# Patient Record
Sex: FEMALE | Race: WHITE | NOT HISPANIC OR LATINO | Employment: UNEMPLOYED | URBAN - NONMETROPOLITAN AREA
[De-identification: names, ages, dates, MRNs, and addresses within clinical notes are randomized per-mention and may not be internally consistent; named-entity substitution may affect disease eponyms.]

---

## 2017-03-24 ENCOUNTER — OFFICE VISIT (OUTPATIENT)
Dept: OBSTETRICS AND GYNECOLOGY | Facility: CLINIC | Age: 67
End: 2017-03-24

## 2017-03-24 VITALS
WEIGHT: 158 LBS | SYSTOLIC BLOOD PRESSURE: 120 MMHG | DIASTOLIC BLOOD PRESSURE: 70 MMHG | BODY MASS INDEX: 28 KG/M2 | HEIGHT: 63 IN

## 2017-03-24 DIAGNOSIS — R68.82 DECREASED LIBIDO: ICD-10-CM

## 2017-03-24 DIAGNOSIS — Z01.411 ENCOUNTER FOR GYNECOLOGICAL EXAMINATION WITH ABNORMAL FINDING: ICD-10-CM

## 2017-03-24 DIAGNOSIS — R35.0 URINARY FREQUENCY: Primary | ICD-10-CM

## 2017-03-24 DIAGNOSIS — N94.10 DYSPAREUNIA IN FEMALE: ICD-10-CM

## 2017-03-24 DIAGNOSIS — N95.2 POSTMENOPAUSAL ATROPHIC VAGINITIS: ICD-10-CM

## 2017-03-24 LAB
COLOR UR: YELLOW
GLUCOSE UR STRIP-MCNC: NEGATIVE MG/DL
LEUKOCYTE EST, POC: NEGATIVE
NITRITE UR-MCNC: NEGATIVE MG/ML
PROT UR STRIP-MCNC: NEGATIVE MG/DL
RBC # UR STRIP: NEGATIVE /UL

## 2017-03-24 PROCEDURE — G0101 CA SCREEN;PELVIC/BREAST EXAM: HCPCS | Performed by: OBSTETRICS & GYNECOLOGY

## 2017-03-24 PROCEDURE — 99202 OFFICE O/P NEW SF 15 MIN: CPT | Performed by: OBSTETRICS & GYNECOLOGY

## 2017-03-24 PROCEDURE — 81002 URINALYSIS NONAUTO W/O SCOPE: CPT | Performed by: OBSTETRICS & GYNECOLOGY

## 2017-03-24 RX ORDER — ESTRADIOL 1 MG/1
1 TABLET ORAL DAILY
Qty: 90 TABLET | Refills: 4 | Status: SHIPPED | OUTPATIENT
Start: 2017-03-24 | End: 2022-01-25

## 2017-03-24 RX ORDER — CETIRIZINE HYDROCHLORIDE 10 MG/1
10 TABLET ORAL DAILY
COMMUNITY

## 2017-03-24 RX ORDER — ESTRADIOL 0.1 MG/G
CREAM VAGINAL
Qty: 30 G | Refills: 12 | Status: SHIPPED | OUTPATIENT
Start: 2017-03-24 | End: 2020-01-27

## 2017-03-24 RX ORDER — ESTRADIOL 1 MG/1
TABLET ORAL
COMMUNITY
Start: 2017-03-06 | End: 2017-03-24

## 2017-03-24 RX ORDER — CITALOPRAM 20 MG/1
TABLET ORAL
COMMUNITY
Start: 2016-12-21 | End: 2020-01-27

## 2017-03-24 NOTE — PROGRESS NOTES
"Subjective  Chief Complaint   Patient presents with   • Gynecologic Exam   • vaginal dryness     Patient is 66 y.o.  here for annual.  Pt had last pap 5-6 years ago.  Pt with history of SHIMON 33 years ago secondary to endometriosis.  Pt still has ovaries.  Pt has been on HRT since age 41.  Pt denies any hot flashes or night sweats.  Pt does have complaints of vaginal dryness and dyspareunia.  Pt does use lubricants.  Pt also with decreased libido secondary to pain.  Pt had MMG in .    History  Past Medical History:   Diagnosis Date   • Arthritis    • Reflux esophagitis      No current outpatient prescriptions on file prior to visit.     No current facility-administered medications on file prior to visit.      Allergies   Allergen Reactions   • Demerol [Meperidine]      Past Surgical History:   Procedure Laterality Date   • CHOLECYSTECTOMY     • HYSTEROSCOPY      SHIMON   • ROTATOR CUFF REPAIR      right     History reviewed. No pertinent family history.  Social History     Social History   • Marital status:      Spouse name: N/A   • Number of children: N/A   • Years of education: N/A     Social History Main Topics   • Smoking status: Never Smoker   • Smokeless tobacco: Never Used   • Alcohol use No   • Drug use: No   • Sexual activity: Yes     Other Topics Concern   • None     Social History Narrative   • None     Review of Systems  All systems were reviewed and negative except for:  Constitution:  positive for trouble sleeping  Eyes:  positive for change of vision  Genitourinary: postivie for  frequency  Musculoskeletal: positive for  joint pain    Objective  Vitals:    17 1323   BP: 120/70   Weight: 158 lb (71.7 kg)   Height: 63\" (160 cm)     Physical Exam:  General Appearance: alert, appears stated age and cooperative  Head: normocephalic, without obvious abnormality and atraumatic  Eyes: lids and lashes normal, conjunctivae and sclerae normal, no icterus, no pallor and corneas clear  Neck: " suppple, trachea midline and no thyromegaly  Lungs: clear to auscultation, respirations regular, respirations even and respirations unlabored  Heart: regular rhythm and normal rate, normal S1, S2, no murmur, gallop, or rubs and no click  Breasts: Examined in supine position  Symmetric without masses or skin dimpling  Nipples normal without inversion, lesions or discharge  There are no palpable axillary nodes  Abdomen: normal bowel sounds, no masses, no hepatomegaly, no splenomegaly, soft non-tender, no guarding and no rebound tenderness  Pelvic: Clinical staff was present for exam  External genitalia:  normal appearance of the external genitalia including Bartholin's and [place]'s glands.  :  urethral meatus normal; urethral hypermobility is absent.  Vaginal:  atrophic mucosal changes are present;  Cervix:  absent.  Uterus:  absent.  Adnexa:  normal bimanual exam of the adnexa.  Extremities: moves extremities well, no edema, no cyanosis and no redness  Skin: no bleeding, bruising or rash and no lesions noted  Psych: normal mood and affect, oriented to person, time and place, thought content organized and appropriate judgment    Lab Review   UA    Imaging   No data reviewed    Assessment/Plan    Problem List Items Addressed This Visit     None      Visit Diagnoses     Urinary frequency    -  Primary    Relevant Orders    POC Urinalysis Dipstick (Completed)    Encounter for gynecological examination with abnormal finding      Pap done; MMG done  Pt with acquired dyspareunia and atrophic changes.  Various options discussed with patient.  Rx compounded estrogen cream given; instructions and precautions given.  Pt to call or f/u if no improvement in 6-8 wks.    Relevant Orders    Pap IG, Rfx HPV ASCU    Postmenopausal atrophic vaginitis        Relevant Medications    estradiol (ESTRACE) 1 MG tablet    estradiol (ESTRACE) 0.1 MG/GM vaginal cream    Dyspareunia in female        Relevant Medications    estradiol (ESTRACE)  0.1 MG/GM vaginal cream    Decreased libido        Relevant Medications    citalopram (CeleXA) 20 MG tablet    estradiol (ESTRACE) 0.1 MG/GM vaginal cream          Follow up prn    This note was electronically signed.  Anastasia Chen M.D.

## 2017-03-31 DIAGNOSIS — Z01.411 ENCOUNTER FOR GYNECOLOGICAL EXAMINATION WITH ABNORMAL FINDING: ICD-10-CM

## 2019-01-21 ENCOUNTER — OFFICE VISIT (OUTPATIENT)
Dept: ORTHOPEDIC SURGERY | Facility: CLINIC | Age: 69
End: 2019-01-21

## 2019-01-21 VITALS — OXYGEN SATURATION: 98 % | BODY MASS INDEX: 25.94 KG/M2 | HEART RATE: 70 BPM | WEIGHT: 146.39 LBS | HEIGHT: 63 IN

## 2019-01-21 DIAGNOSIS — G89.29 CHRONIC RIGHT SHOULDER PAIN: Primary | ICD-10-CM

## 2019-01-21 DIAGNOSIS — M25.511 CHRONIC RIGHT SHOULDER PAIN: Primary | ICD-10-CM

## 2019-01-21 PROCEDURE — 99203 OFFICE O/P NEW LOW 30 MIN: CPT | Performed by: ORTHOPAEDIC SURGERY

## 2019-01-21 RX ORDER — MECLIZINE HCL 12.5 MG/1
12.5 TABLET ORAL 3 TIMES DAILY PRN
COMMUNITY
End: 2020-01-27

## 2019-01-21 RX ORDER — PANTOPRAZOLE SODIUM 40 MG/1
TABLET, DELAYED RELEASE ORAL
COMMUNITY

## 2019-01-21 RX ORDER — IPRATROPIUM BROMIDE 21 UG/1
SPRAY, METERED NASAL
COMMUNITY

## 2019-01-21 NOTE — PROGRESS NOTES
[facility] [place] [facility] Note    Subjective     Chief Complaint   Patient presents with   • Right Shoulder - Pain        HPI    Ernestina Barnhart is a 68 y.o. female.  She reports pain in the right shoulder, which is been present for 15 years, with surgery with a rotator cuff repair in 1997 with Dr. Arroyo.  The pain is 7 out of 10, throbbing, worse with working and when she sleeps on it at night.  2 previous injections in the past, with brief relief.  MRI was performed in 2015.  No long-term improvement with anti-inflammatories.      There is no problem list on file for this patient.    Past Medical History:   Diagnosis Date   • Arthritis    • Reflux esophagitis       Past Surgical History:   Procedure Laterality Date   • CHOLECYSTECTOMY     • HYSTEROSCOPY  1984    SHIMON   • ROTATOR CUFF REPAIR      right      History reviewed. No pertinent family history.  Social History     Socioeconomic History   • Marital status:      Spouse name: Not on file   • Number of children: Not on file   • Years of education: Not on file   • Highest education level: Not on file   Social Needs   • Financial resource strain: Not on file   • Food insecurity - worry: Not on file   • Food insecurity - inability: Not on file   • Transportation needs - medical: Not on file   • Transportation needs - non-medical: Not on file   Occupational History   • Not on file   Tobacco Use   • Smoking status: Never Smoker   • Smokeless tobacco: Never Used   Substance and Sexual Activity   • Alcohol use: No   • Drug use: No   • Sexual activity: Yes   Other Topics Concern   • Not on file   Social History Narrative   • Not on file      Current Outpatient Medications on File Prior to Visit   Medication Sig Dispense Refill   • meclizine (ANTIVERT) 12.5 MG tablet Take 12.5 mg by mouth 3 (Three) Times a Day As Needed for dizziness.     • cetirizine (zyrTEC) 10 MG tablet Take 10 mg by mouth Daily.     • citalopram (CeleXA) 20 MG tablet      • estradiol  (ESTRACE) 0.1 MG/GM vaginal cream 0.5 grams intravaginal 2-3x/wk 30 g 12   • estradiol (ESTRACE) 1 MG tablet Take 1 tablet by mouth Daily. 90 tablet 4   • ipratropium (ATROVENT) 0.03 % nasal spray ipratropium bromide 0.03 % nasal spray   Spray 2 sprays every day by intranasal route.     • pantoprazole (PROTONIX) 40 MG EC tablet pantoprazole 40 mg tablet,delayed release     • [DISCONTINUED] NEXIUM 40 MG capsule        No current facility-administered medications on file prior to visit.       Allergies   Allergen Reactions   • Demerol [Meperidine]         Review of Systems   Constitutional: Negative for activity change, appetite change, chills, diaphoresis, fatigue, fever and unexpected weight change.   HENT: Positive for ear pain, hearing loss, postnasal drip and sinus pressure. Negative for congestion, dental problem, drooling, ear discharge, facial swelling, mouth sores, nosebleeds, rhinorrhea, sneezing, sore throat, tinnitus, trouble swallowing and voice change.    Eyes: Positive for pain and itching. Negative for photophobia, discharge, redness and visual disturbance.   Respiratory: Negative for apnea, cough, choking, chest tightness, shortness of breath, wheezing and stridor.    Cardiovascular: Negative for chest pain, palpitations and leg swelling.   Gastrointestinal: Negative for abdominal distention, abdominal pain, anal bleeding, blood in stool, constipation, diarrhea, nausea, rectal pain and vomiting.   Endocrine: Negative for cold intolerance, heat intolerance, polydipsia, polyphagia and polyuria.   Genitourinary: Positive for frequency, pelvic pain and urgency. Negative for decreased urine volume, difficulty urinating, dysuria, enuresis, flank pain, genital sores and hematuria.   Musculoskeletal: Positive for arthralgias, back pain, joint swelling, neck pain and neck stiffness. Negative for gait problem and myalgias.   Skin: Negative for color change, pallor, rash and wound.   Allergic/Immunologic:  "Negative for environmental allergies, food allergies and immunocompromised state.   Neurological: Positive for dizziness and headaches. Negative for tremors, seizures, syncope, facial asymmetry, speech difficulty, weakness, light-headedness and numbness.   Hematological: Negative for adenopathy. Does not bruise/bleed easily.   Psychiatric/Behavioral: Positive for sleep disturbance. Negative for agitation, behavioral problems, confusion, decreased concentration, dysphoric mood, hallucinations, self-injury and suicidal ideas. The patient is not nervous/anxious and is not hyperactive.         Objective      Physical Exam  Pulse 70   Ht 160 cm (62.99\")   Wt 66.4 kg (146 lb 6.2 oz)   SpO2 98%   BMI 25.94 kg/m²     Body mass index is 25.94 kg/m².    General:   Mental Status:  Alert   Appearance: Cooperative, in no acute distress   Build and Nutrition: Well-nourished and well developed female   Orientation: Alert and oriented to person, place and time   Posture: Normal   Gait: Normal    Integument:   Right shoulder: No skin lesions, no rash, no ecchymosis    Neurologic:   Sensation:    Right hand: Intact to light touch in the digits   Motor:  Right upper extremity: 5/5 deltoid, biceps, triceps, wrist flexors, wrist extensors, and interossei    Vascular:   Right upper extremity: 2+ radial pulse, prompt capillary refill    Upper Extremities:   Right Shoulder:    Tenderness:  None    Swelling:  None    Crepitus: None    Atrophy:  None    Range of motion:  External rotation:  60°       Forward flexion:  170°       Abduction:   160°  Instability:  None  Deformities:  None  Functional testing: Negative drop arm, negative lift-off, positive impingement      Imaging/Studies  Imaging Results (last 24 hours)     Procedure Component Value Units Date/Time    XR Shoulder 2+ View Right [[id number]] Resulted:  01/21/19 1710     Updated:  01/21/19 1711    Narrative:       Right Shoulder Radiographs  Indication: right shoulder " pain  Views: AP, outlet and axillary views of the right shoulder    Comparison: no prior studies available for review    Findings:  Fine calcifications in the region of the rotator cuff, near the insertion   point greater tuberosity, with acromioclavicular joint degeneration, with   no acute bony abnormalities.  Humeral head is not high riding.                Assessment and Plan     Ernestina was seen today for pain.    Diagnoses and all orders for this visit:    Chronic right shoulder pain  -     XR Shoulder 2+ View Right  -     FL Contrast Injection CT / MRI; Future  -     MRI shoulder right arthrogram; Future        I reviewed my findings with patient today.  She has a long-standing history of right shoulder pain, with a previous rotator cuff repair in 1997 with Dr. Arroyo.  At this point, I have recommended a repeat MRI, with and without intra-articular contrast, specifically looking at her rotator cuff.  I will see her back after the MRI has been completed, but sooner for any problems.    Return for After Imaging Study.      Medical Decision Making  Data/Risk: radiology tests and independent visualization of imaging, lab tests, or EMG/NCV      Pito Ordoñez MD  01/21/19  5:28 PM

## 2019-01-29 ENCOUNTER — HOSPITAL ENCOUNTER (OUTPATIENT)
Dept: MRI IMAGING | Facility: HOSPITAL | Age: 69
Discharge: HOME | End: 2019-01-29
Attending: ORTHOPAEDIC SURGERY

## 2019-01-29 ENCOUNTER — HOSPITAL ENCOUNTER (OUTPATIENT)
Dept: GENERAL RADIOLOGY | Facility: HOSPITAL | Age: 69
Discharge: HOME | End: 2019-01-29
Attending: ORTHOPAEDIC SURGERY | Admitting: RADIOLOGY

## 2019-01-29 DIAGNOSIS — G89.29 CHRONIC RIGHT SHOULDER PAIN: ICD-10-CM

## 2019-01-29 DIAGNOSIS — M25.511 CHRONIC RIGHT SHOULDER PAIN: ICD-10-CM

## 2019-01-29 PROCEDURE — 0 GADOBENATE DIMEGLUMINE 529 MG/ML SOLUTION: Performed by: ORTHOPAEDIC SURGERY

## 2019-01-29 PROCEDURE — 73222 MRI JOINT UPR EXTREM W/DYE: CPT

## 2019-01-29 PROCEDURE — 77002 NEEDLE LOCALIZATION BY XRAY: CPT

## 2019-01-29 PROCEDURE — A9577 INJ MULTIHANCE: HCPCS | Performed by: ORTHOPAEDIC SURGERY

## 2019-01-29 PROCEDURE — 25010000002 IOPAMIDOL 61 % SOLUTION: Performed by: ORTHOPAEDIC SURGERY

## 2019-01-29 RX ORDER — LIDOCAINE HYDROCHLORIDE 10 MG/ML
10 INJECTION, SOLUTION INFILTRATION; PERINEURAL ONCE
Status: COMPLETED | OUTPATIENT
Start: 2019-01-29 | End: 2019-01-29

## 2019-01-29 RX ADMIN — LIDOCAINE HYDROCHLORIDE 10 ML: 10 INJECTION, SOLUTION INFILTRATION; PERINEURAL at 14:12

## 2019-01-29 RX ADMIN — GADOBENATE DIMEGLUMINE 0.2 ML: 529 INJECTION, SOLUTION INTRAVENOUS at 14:11

## 2019-01-29 RX ADMIN — IOPAMIDOL 10 ML: 612 INJECTION, SOLUTION INTRAVENOUS at 14:12

## 2019-02-13 ENCOUNTER — OFFICE VISIT (OUTPATIENT)
Dept: ORTHOPEDIC SURGERY | Facility: CLINIC | Age: 69
End: 2019-02-13

## 2019-02-13 VITALS — HEART RATE: 73 BPM | BODY MASS INDEX: 25.59 KG/M2 | OXYGEN SATURATION: 99 % | WEIGHT: 149.91 LBS | HEIGHT: 64 IN

## 2019-02-13 DIAGNOSIS — M75.101 ROTATOR CUFF SYNDROME OF RIGHT SHOULDER: Primary | ICD-10-CM

## 2019-02-13 PROCEDURE — 99213 OFFICE O/P EST LOW 20 MIN: CPT | Performed by: ORTHOPAEDIC SURGERY

## 2019-02-13 PROCEDURE — 20610 DRAIN/INJ JOINT/BURSA W/O US: CPT | Performed by: ORTHOPAEDIC SURGERY

## 2019-02-13 RX ORDER — LIDOCAINE HYDROCHLORIDE 10 MG/ML
4 INJECTION, SOLUTION INFILTRATION; PERINEURAL
Status: COMPLETED | OUTPATIENT
Start: 2019-02-13 | End: 2019-02-13

## 2019-02-13 RX ORDER — TRIAMCINOLONE ACETONIDE 40 MG/ML
40 INJECTION, SUSPENSION INTRA-ARTICULAR; INTRAMUSCULAR
Status: COMPLETED | OUTPATIENT
Start: 2019-02-13 | End: 2019-02-13

## 2019-02-13 RX ADMIN — LIDOCAINE HYDROCHLORIDE 4 ML: 10 INJECTION, SOLUTION INFILTRATION; PERINEURAL at 16:24

## 2019-02-13 RX ADMIN — TRIAMCINOLONE ACETONIDE 40 MG: 40 INJECTION, SUSPENSION INTRA-ARTICULAR; INTRAMUSCULAR at 16:24

## 2019-02-13 NOTE — PROGRESS NOTES
[facility] [place] [facility] [place]    Subjective     Chief Complaint   Patient presents with   • Follow-up     Post MRI right shoulder         HPI    Ernestina Barnhart is a 68 y.o. female.  She follows up today for the MRI results for right shoulder.  Pain is been ongoing for 20 years, with a history of rotator cuff repair by Dr. Arroyo in 1997.  Pain bothers her when she sleeps on it at night, is 7 out of 10, and aching in quality.  No new complaints compared last time that she was here.      There is no problem list on file for this patient.    Past Medical History:   Diagnosis Date   • Arthritis    • Reflux esophagitis       Past Surgical History:   Procedure Laterality Date   • CHOLECYSTECTOMY     • HYSTEROSCOPY  1984    SHIMON   • ROTATOR CUFF REPAIR      right      History reviewed. No pertinent family history.  Social History     Socioeconomic History   • Marital status:      Spouse name: Not on file   • Number of children: Not on file   • Years of education: Not on file   • Highest education level: Not on file   Social Needs   • Financial resource strain: Not on file   • Food insecurity - worry: Not on file   • Food insecurity - inability: Not on file   • Transportation needs - medical: Not on file   • Transportation needs - non-medical: Not on file   Occupational History   • Not on file   Tobacco Use   • Smoking status: Never Smoker   • Smokeless tobacco: Never Used   Substance and Sexual Activity   • Alcohol use: No   • Drug use: No   • Sexual activity: Yes   Other Topics Concern   • Not on file   Social History Narrative   • Not on file      Current Outpatient Medications on File Prior to Visit   Medication Sig Dispense Refill   • cetirizine (zyrTEC) 10 MG tablet Take 10 mg by mouth Daily.     • citalopram (CeleXA) 20 MG tablet      • estradiol (ESTRACE) 0.1 MG/GM vaginal cream 0.5 grams intravaginal 2-3x/wk 30 g 12   • estradiol (ESTRACE) 1 MG tablet Take 1 tablet by mouth Daily. 90 tablet 4   •  ipratropium (ATROVENT) 0.03 % nasal spray ipratropium bromide 0.03 % nasal spray   Spray 2 sprays every day by intranasal route.     • meclizine (ANTIVERT) 12.5 MG tablet Take 12.5 mg by mouth 3 (Three) Times a Day As Needed for dizziness.     • pantoprazole (PROTONIX) 40 MG EC tablet pantoprazole 40 mg tablet,delayed release       No current facility-administered medications on file prior to visit.       Allergies   Allergen Reactions   • Demerol [Meperidine]         Review of Systems   Constitutional: Negative for activity change, appetite change, chills, diaphoresis, fatigue, fever and unexpected weight change.   HENT: Negative for congestion, dental problem, drooling, ear discharge, ear pain, facial swelling, hearing loss, mouth sores, nosebleeds, postnasal drip, rhinorrhea, sinus pressure, sneezing, sore throat, tinnitus, trouble swallowing and voice change.    Eyes: Negative for photophobia, pain, discharge, redness, itching and visual disturbance.   Respiratory: Negative for apnea, cough, choking, chest tightness, shortness of breath, wheezing and stridor.    Cardiovascular: Negative for chest pain, palpitations and leg swelling.   Gastrointestinal: Negative for abdominal distention, abdominal pain, anal bleeding, blood in stool, constipation, diarrhea, nausea, rectal pain and vomiting.   Endocrine: Negative for cold intolerance, heat intolerance, polydipsia, polyphagia and polyuria.   Genitourinary: Negative for decreased urine volume, difficulty urinating, dysuria, enuresis, flank pain, frequency, genital sores, hematuria and urgency.   Musculoskeletal: Positive for joint swelling. Negative for arthralgias, back pain, gait problem, myalgias, neck pain and neck stiffness.   Skin: Negative for color change, pallor, rash and wound.   Allergic/Immunologic: Negative for environmental allergies, food allergies and immunocompromised state.   Neurological: Negative for dizziness, tremors, seizures, syncope, facial  "asymmetry, speech difficulty, weakness, light-headedness, numbness and headaches.   Hematological: Negative for adenopathy. Does not bruise/bleed easily.   Psychiatric/Behavioral: Negative for agitation, behavioral problems, confusion, decreased concentration, dysphoric mood, hallucinations, self-injury, sleep disturbance and suicidal ideas. The patient is not nervous/anxious and is not hyperactive.         Objective      Physical Exam  Pulse 73   Ht 162.6 cm (64.02\")   Wt 68 kg (149 lb 14.6 oz)   SpO2 99%   BMI 25.72 kg/m²     Body mass index is 25.72 kg/m².    General:   Mental Status:  Alert   Appearance: Cooperative, in no acute distress   Build and Nutrition: Well-nourished and well developed female   Orientation: Alert and oriented to person, place and time   Posture: Normal   Gait: Normal    Upper Extremities:   Right Shoulder:    Tenderness:  None    Swelling:  None    Range of motion:  External rotation:  60°       Forward flexion:  160°       Abduction:   150°  Deformities:  None  Functional testing: Negative drop arm, negative lift-off, positive impingement          Imaging/Studies   EXAMINATION: MRI SHOULDER RIGHT ARTHROGRAM- 01/29/2019     INDICATION: Shoulder pain, rotator cuff tear/impingement suspected;  M25.511-Pain in right shoulder; G89.29-Other chronic pain         TECHNIQUE: Axial gradient echo, sagittal T1 STIR, coronal T1 and T2  fat-sat images of the right shoulder. Axial sagittal and coronal T1  fat-sat images following intra-articular gadolinium administration.     COMPARISON: Shoulder plain films 01/21/2019.     FINDINGS: Patient history indicates right shoulder pain present x 15  years, previous rotator cuff repair in 1997.      Unenhanced images show moderate joint and bursal effusion. Mid  supraspinatus tendon is outlined by fluid on both sides, and is markedly  attenuated appearing at least with a significant partial undersurface  tear on the coronal series. Sagittal images appear " to show focal  full-thickness tear.  There is diffuse thickening of the subscapularis  tendon and infraspinatus tendon, at least significant tendinopathy  possibly small partial-thickness tears. There is no evidence of muscle  edema or atrophy. Biceps tendon, anchor and superior labrum appear  intact. Anterior, posterior and inferior portions of the labrum appear  intact. There is fairly extensive AC joint arthropathy.     Post arthrogram images show contrast opacifying both the joint capsule  and subdeltoid bursa, confirming full-thickness tear of the  supraspinatus. Remaining rotator cuff tendons appear grossly intact.     IMPRESSION:  1. Torn supraspinatus, with fairly long segment undersurface tear, some  intact bridging fibers, and focal full-thickness tear.  2. Distal subscapularis and infraspinatus tendinopathy.     D:  01/30/2019  E:  01/30/2019         This report was finalized on 2/2/2019 11:03 AM by DR. Jake Ruelas MD.           Assessment and Plan     Ernestina was seen today for follow-up.    Diagnoses and all orders for this visit:    Rotator cuff syndrome of right shoulder  -     Large Joint Arthrocentesis: R subacromial bursa  -     Ambulatory Referral to Physical Therapy Evaluate and treat        I reviewed my findings with patient today.  MRI does show rotator cuff tear, but she seems to be functionally compensated, and is not interested in surgical intervention.  At this point, we will do a subacromial injection and physical therapy.  I will see her back in 3 months, but sooner for any problems.    Of note, she had 70% improvement just a few minutes following the injection.    Return in about 3 months (around 5/13/2019).      Medical Decision Making  Management Options : prescription/IM medicine and physical/occupational therapy  Data/Risk: independent visualization of imaging, lab tests, or EMG/NCV      Pito Ordoñez MD  02/13/19  4:33 PM

## 2019-02-13 NOTE — PROGRESS NOTES
Procedure   Large Joint Arthrocentesis: R subacromial bursa  Date/Time: 2/13/2019 4:24 PM  Consent given by: patient  Site marked: site marked  Timeout: Immediately prior to procedure a time out was called to verify the correct patient, procedure, equipment, support staff and site/side marked as required   Supporting Documentation  Indications: pain   Procedure Details  Location: shoulder - R subacromial bursa  Preparation: Patient was prepped and draped in the usual sterile fashion  Needle size: 22 G  Medications administered: 4 mL lidocaine 1 %; 40 mg triamcinolone acetonide 40 MG/ML  Patient tolerance: patient tolerated the procedure well with no immediate complications

## 2019-05-15 ENCOUNTER — OFFICE VISIT (OUTPATIENT)
Dept: ORTHOPEDIC SURGERY | Facility: CLINIC | Age: 69
End: 2019-05-15

## 2019-05-15 VITALS — OXYGEN SATURATION: 100 % | HEIGHT: 64 IN | WEIGHT: 154.76 LBS | BODY MASS INDEX: 26.42 KG/M2 | HEART RATE: 100 BPM

## 2019-05-15 DIAGNOSIS — M75.101 ROTATOR CUFF SYNDROME OF RIGHT SHOULDER: Primary | ICD-10-CM

## 2019-05-15 PROCEDURE — 99212 OFFICE O/P EST SF 10 MIN: CPT | Performed by: ORTHOPAEDIC SURGERY

## 2019-05-15 NOTE — PROGRESS NOTES
[facility] [place] [facility]    Subjective     Chief Complaint   Patient presents with   • Right Shoulder - Follow-up     3 month follow up, Rotator cuff syndrome of right shoulder.  Last cortisone injections given 2/13/19        HPI    Ernestina Barnhart is a 68 y.o. female.  She follows up today for right shoulder.  Right shoulder has improved with physical therapy and the injection.  No pain today, and no new complaints.      There is no problem list on file for this patient.    Past Medical History:   Diagnosis Date   • Arthritis    • Reflux esophagitis       Past Surgical History:   Procedure Laterality Date   • CHOLECYSTECTOMY     • HYSTEROSCOPY  1984    SHIMON   • ROTATOR CUFF REPAIR      right      History reviewed. No pertinent family history.  Social History     Socioeconomic History   • Marital status:      Spouse name: Not on file   • Number of children: Not on file   • Years of education: Not on file   • Highest education level: Not on file   Tobacco Use   • Smoking status: Never Smoker   • Smokeless tobacco: Never Used   Substance and Sexual Activity   • Alcohol use: No   • Drug use: No   • Sexual activity: Yes      Current Outpatient Medications on File Prior to Visit   Medication Sig Dispense Refill   • cetirizine (zyrTEC) 10 MG tablet Take 10 mg by mouth Daily.     • citalopram (CeleXA) 20 MG tablet      • estradiol (ESTRACE) 0.1 MG/GM vaginal cream 0.5 grams intravaginal 2-3x/wk 30 g 12   • estradiol (ESTRACE) 1 MG tablet Take 1 tablet by mouth Daily. 90 tablet 4   • ipratropium (ATROVENT) 0.03 % nasal spray ipratropium bromide 0.03 % nasal spray   Spray 2 sprays every day by intranasal route.     • meclizine (ANTIVERT) 12.5 MG tablet Take 12.5 mg by mouth 3 (Three) Times a Day As Needed for dizziness.     • pantoprazole (PROTONIX) 40 MG EC tablet pantoprazole 40 mg tablet,delayed release       No current facility-administered medications on file prior to visit.       Allergies  "  Allergen Reactions   • Demerol [Meperidine]         Review of Systems   Constitutional: Positive for activity change.   HENT: Negative.    Eyes: Negative.    Respiratory: Negative.    Cardiovascular: Negative.    Gastrointestinal: Negative.    Endocrine: Negative.    Genitourinary: Negative.    Musculoskeletal: Positive for arthralgias and back pain.   Skin: Negative.    Allergic/Immunologic: Negative.    Neurological: Negative.    Hematological: Negative.    Psychiatric/Behavioral: Negative.         Objective      Physical Exam  Pulse 100   Ht 162.6 cm (64.02\")   Wt 70.2 kg (154 lb 12.2 oz)   SpO2 100%   Breastfeeding? No   BMI 26.55 kg/m²     Body mass index is 26.55 kg/m².    General:   Mental Status:  Alert   Appearance: Cooperative, in no acute distress   Build and Nutrition: Well-nourished well-developed female   Orientation: Alert and oriented to person, place and time   Posture: Normal   Gait: Normal    Upper Extremities:              Right Shoulder:                          Tenderness:    None                          Swelling:          None                          Range of motion:        External rotation:         70°                                                              Forward flexion:          180°                                                              Abduction:                   180°  Deformities:     None  Functional testing: Negative drop arm, negative lift-off,  negative impingement    Imaging/Studies      Imaging Results (last 24 hours)     ** No results found for the last 24 hours. **          Assessment and Plan     Ernestina was seen today for follow-up.    Diagnoses and all orders for this visit:    Rotator cuff syndrome of right shoulder        1. Rotator cuff syndrome of right shoulder        I reviewed my findings with the patient today.  Her right shoulder is doing well, she responded well to the injection and physical therapy.  I will see her back in the future for any " problems.  Repeat injection could be considered if she has a flareup in the future.    Return if symptoms worsen or fail to improve.        Pito Ordoñez MD  05/15/19  2:50 PM

## 2020-01-27 ENCOUNTER — OFFICE VISIT (OUTPATIENT)
Dept: ORTHOPEDIC SURGERY | Facility: CLINIC | Age: 70
End: 2020-01-27

## 2020-01-27 VITALS — OXYGEN SATURATION: 100 % | HEART RATE: 79 BPM | HEIGHT: 64 IN | WEIGHT: 162 LBS | BODY MASS INDEX: 27.66 KG/M2

## 2020-01-27 DIAGNOSIS — M75.101 ROTATOR CUFF SYNDROME OF RIGHT SHOULDER: Primary | ICD-10-CM

## 2020-01-27 PROCEDURE — 20610 DRAIN/INJ JOINT/BURSA W/O US: CPT | Performed by: ORTHOPAEDIC SURGERY

## 2020-01-27 PROCEDURE — 99212 OFFICE O/P EST SF 10 MIN: CPT | Performed by: ORTHOPAEDIC SURGERY

## 2020-01-27 RX ORDER — ROPIVACAINE HYDROCHLORIDE 5 MG/ML
4 INJECTION, SOLUTION EPIDURAL; INFILTRATION; PERINEURAL
Status: COMPLETED | OUTPATIENT
Start: 2020-01-27 | End: 2020-01-27

## 2020-01-27 RX ORDER — FLUTICASONE PROPIONATE 50 MCG
1 SPRAY, SUSPENSION (ML) NASAL 2 TIMES DAILY
COMMUNITY
Start: 2020-01-23

## 2020-01-27 RX ORDER — TRIAMCINOLONE ACETONIDE 40 MG/ML
40 INJECTION, SUSPENSION INTRA-ARTICULAR; INTRAMUSCULAR
Status: COMPLETED | OUTPATIENT
Start: 2020-01-27 | End: 2020-01-27

## 2020-01-27 RX ORDER — MECLIZINE HYDROCHLORIDE 25 MG/1
1 TABLET ORAL 3 TIMES DAILY
COMMUNITY

## 2020-01-27 RX ADMIN — ROPIVACAINE HYDROCHLORIDE 4 ML: 5 INJECTION, SOLUTION EPIDURAL; INFILTRATION; PERINEURAL at 11:17

## 2020-01-27 RX ADMIN — TRIAMCINOLONE ACETONIDE 40 MG: 40 INJECTION, SUSPENSION INTRA-ARTICULAR; INTRAMUSCULAR at 11:17

## 2020-01-27 NOTE — PROGRESS NOTES
Procedure   Large Joint Arthrocentesis: R subacromial bursa  Date/Time: 1/27/2020 11:17 AM  Consent given by: patient  Site marked: site marked  Timeout: Immediately prior to procedure a time out was called to verify the correct patient, procedure, equipment, support staff and site/side marked as required   Supporting Documentation  Indications: pain   Procedure Details  Location: shoulder - R subacromial bursa  Preparation: Patient was prepped and draped in the usual sterile fashion  Needle size: 22 G  Approach: posterior  Medications administered: 4 mL ropivacaine 0.5 %; 40 mg triamcinolone acetonide 40 MG/ML  Patient tolerance: patient tolerated the procedure well with no immediate complications

## 2020-01-27 NOTE — PROGRESS NOTES
[facility] [place] [facility] Note    Subjective     Chief Complaint   Patient presents with   • Right Shoulder - Pain     Rotator cuff syndrome of right shoulder-last injection 2/13/19        HPI    Ernestina Barnhart is a 69 y.o. female.  She follows up today for her right shoulder.  She has had pain off and on for the past 20 years in the shoulder, which is currently 7 out of 10, worse when she sleeps on it at night.  Previous injection in February 2019 provided good relief.      There is no problem list on file for this patient.    Past Medical History:   Diagnosis Date   • Arthritis    • Reflux esophagitis       Past Surgical History:   Procedure Laterality Date   • CHOLECYSTECTOMY     • HYSTEROSCOPY  1984    [facility]   • ROTATOR CUFF REPAIR      right      History reviewed. No pertinent family history.  Social History     Socioeconomic History   • Marital status:      Spouse name: Not on file   • Number of children: Not on file   • Years of education: Not on file   • Highest education level: Not on file   Tobacco Use   • Smoking status: Never Smoker   • Smokeless tobacco: Never Used   Substance and Sexual Activity   • Alcohol use: No   • Drug use: No   • Sexual activity: Yes      Current Outpatient Medications on File Prior to Visit   Medication Sig Dispense Refill   • cetirizine (zyrTEC) 10 MG tablet Take 10 mg by mouth Daily.     • estradiol (ESTRACE) 1 MG tablet Take 1 tablet by mouth Daily. 90 tablet 4   • fluticasone (FLONASE) 50 MCG/ACT nasal spray 1 spray into the nostril(s) as directed by provider 2 (Two) Times a Day.     • ipratropium (ATROVENT) 0.03 % nasal spray ipratropium bromide 0.03 % nasal spray   Spray 2 sprays every day by intranasal route.     • meclizine (ANTIVERT) 25 MG tablet Take 1 tablet by mouth 3 (Three) Times a Day.     • pantoprazole (PROTONIX) 40 MG EC tablet pantoprazole 40 mg tablet,delayed release     • [DISCONTINUED] citalopram (CeleXA) 20 MG tablet      • [DISCONTINUED]  "estradiol (ESTRACE) 0.1 MG/GM vaginal cream 0.5 grams intravaginal 2-3x/wk 30 g 12   • [DISCONTINUED] meclizine (ANTIVERT) 12.5 MG tablet Take 12.5 mg by mouth 3 (Three) Times a Day As Needed for dizziness.       No current facility-administered medications on file prior to visit.       Allergies   Allergen Reactions   • Demerol [Meperidine]         Review of Systems   Constitutional: Negative.    HENT: Positive for hearing loss and sinus pressure.    Eyes: Negative.    Respiratory: Negative.    Cardiovascular: Negative.    Gastrointestinal: Positive for abdominal pain.   Endocrine: Negative.    Genitourinary: Negative.    Musculoskeletal: Positive for arthralgias and back pain.   Skin: Negative.    Allergic/Immunologic: Negative.    Neurological: Positive for dizziness.   Hematological: Bruises/bleeds easily.   Psychiatric/Behavioral: Negative.         Objective      Physical Exam  Pulse 79   Ht 162.6 cm (64.02\")   Wt 73.5 kg (162 lb)   SpO2 100%   BMI 27.79 kg/m²     Body mass index is 27.79 kg/m².    General:   Mental Status:  Alert   Appearance: Cooperative, in no acute distress   Build and Nutrition: Well-nourished well-developed female   Orientation: Alert and oriented to person, place and time   Posture: Normal   Gait: Normal    Upper Extremities:              Right Shoulder:                          Tenderness:    None                          Swelling:          None                          Range of motion:        External rotation:         30°                                                              Forward flexion:          160°                                                              Abduction:                   150°  Deformities:     None  Functional testing: Negative drop arm, negative lift-off, positive impingement    Imaging/Studies      Imaging Results (Last 24 Hours)     Procedure Component Value Units Date/Time    XR Shoulder 2+ View Right [[id number]] Resulted:  01/27/20 1110     " Updated:  01/27/20 1111    Narrative:       Right Shoulder Radiographs  Indication: right shoulder pain  Views: AP, outlet and axillary views of the right shoulder    Comparison: 1/21/2019    Findings:  Calcification in the supraspinatus region, with sclerosis at the greater   tuberosity, type III acromion, with no significant change compared to the   previous film.            Assessment and Plan     Ernestina was seen today for pain.    Diagnoses and all orders for this visit:    Rotator cuff syndrome of right shoulder  -     XR Shoulder 2+ View Right  -     Large Joint Arthrocentesis: R subacromial bursa        1. Rotator cuff syndrome of right shoulder        I reviewed my findings with the patient today.  She does have a history of rotator cuff repair many years ago with Dr. Arroyo, and appears to have a rotator cuff tear from an MRI previously.  Conservative treatment has been effective, and she would like to have an injection today.  I will see her back in 3 months, but sooner for any problems.    Of note, she could not tell a significant difference just a few minutes following the injection today.    Return in about 3 months (around 4/27/2020).        Medical Decision Making  Management Options : prescription/IM medicine  Data/Risk: radiology tests and independent visualization of imaging, lab tests, or EMG/NCV      Pito Ordoñez MD  01/27/20  1:24 PM

## 2020-06-24 ENCOUNTER — OFFICE VISIT (OUTPATIENT)
Dept: ORTHOPEDIC SURGERY | Facility: CLINIC | Age: 70
End: 2020-06-24

## 2020-06-24 VITALS — BODY MASS INDEX: 26.76 KG/M2 | HEART RATE: 66 BPM | OXYGEN SATURATION: 98 % | WEIGHT: 156.75 LBS | HEIGHT: 64 IN

## 2020-06-24 DIAGNOSIS — M75.101 ROTATOR CUFF SYNDROME OF RIGHT SHOULDER: Primary | ICD-10-CM

## 2020-06-24 PROCEDURE — 99212 OFFICE O/P EST SF 10 MIN: CPT | Performed by: ORTHOPAEDIC SURGERY

## 2020-06-24 RX ORDER — DULOXETIN HYDROCHLORIDE 30 MG/1
CAPSULE, DELAYED RELEASE ORAL
COMMUNITY
Start: 2020-05-29 | End: 2020-10-19 | Stop reason: DRUGHIGH

## 2020-06-24 NOTE — PROGRESS NOTES
[facility] [place] [facility] Note    Subjective     Chief Complaint   Patient presents with   • Right Shoulder - Follow-up     5 month f/u  Rotator cuff syndrome of right shoulder        HPI    It has been 5  month(s) since Ms. Barnhart's last visit. She returns to clinic today for follow-up of right shoulder pain. She rates her pain a 5/10 on the pain scale. Previous/current treatments: physical therapy. Current symptoms: pain. The pain is worse with sleeping; pain medication and/or NSAID improve the pain. Overall, she is improved from her last visit.  Her shoulder is tolerable today, and the injection on her previous visit provided relief.    I have reviewed the following portions of the patient's history:History of Present Illness     There is no problem list on file for this patient.    Past Medical History:   Diagnosis Date   • Arthritis    • Reflux esophagitis       Past Surgical History:   Procedure Laterality Date   • CHOLECYSTECTOMY     • HYSTEROSCOPY  1984    [facility]   • ROTATOR CUFF REPAIR      right      History reviewed. No pertinent family history.  Social History     Socioeconomic History   • Marital status:      Spouse name: Not on file   • Number of children: Not on file   • Years of education: Not on file   • Highest education level: Not on file   Tobacco Use   • Smoking status: Never Smoker   • Smokeless tobacco: Never Used   Substance and Sexual Activity   • Alcohol use: No   • Drug use: No   • Sexual activity: Yes      Current Outpatient Medications on File Prior to Visit   Medication Sig Dispense Refill   • cetirizine (zyrTEC) 10 MG tablet Take 10 mg by mouth Daily.     • DULoxetine (CYMBALTA) 30 MG capsule      • estradiol (ESTRACE) 1 MG tablet Take 1 tablet by mouth Daily. 90 tablet 4   • fluticasone (FLONASE) 50 MCG/ACT nasal spray 1 spray into the nostril(s) as directed by provider 2 (Two) Times a Day.     • ipratropium (ATROVENT) 0.03 % nasal spray ipratropium bromide 0.03 % nasal  spray   Spray 2 sprays every day by intranasal route.     • meclizine (ANTIVERT) 25 MG tablet Take 1 tablet by mouth 3 (Three) Times a Day.     • pantoprazole (PROTONIX) 40 MG EC tablet pantoprazole 40 mg tablet,delayed release       No current facility-administered medications on file prior to visit.       Allergies   Allergen Reactions   • Demerol [Meperidine]         Review of Systems   Constitutional: Negative for activity change, appetite change, chills, diaphoresis, fatigue, fever and unexpected weight change.   HENT: Negative for congestion, dental problem, drooling, ear discharge, ear pain, facial swelling, hearing loss, mouth sores, nosebleeds, postnasal drip, rhinorrhea, sinus pressure, sneezing, sore throat, tinnitus, trouble swallowing and voice change.    Eyes: Negative for photophobia, pain, discharge, redness, itching and visual disturbance.   Respiratory: Negative for apnea, cough, choking, chest tightness, shortness of breath, wheezing and stridor.    Cardiovascular: Negative for chest pain, palpitations and leg swelling.   Gastrointestinal: Negative for abdominal distention, abdominal pain, anal bleeding, blood in stool, constipation, diarrhea, nausea, rectal pain and vomiting.   Endocrine: Negative for cold intolerance, heat intolerance, polydipsia, polyphagia and polyuria.   Genitourinary: Negative for decreased urine volume, difficulty urinating, dysuria, enuresis, flank pain, frequency, genital sores, hematuria and urgency.   Musculoskeletal: Positive for arthralgias. Negative for back pain, gait problem, joint swelling, myalgias, neck pain and neck stiffness.   Skin: Negative for color change, pallor, rash and wound.   Allergic/Immunologic: Negative for environmental allergies, food allergies and immunocompromised state.   Neurological: Negative for dizziness, tremors, seizures, syncope, facial asymmetry, speech difficulty, weakness, light-headedness, numbness and headaches.   Hematological:  "Negative for adenopathy. Does not bruise/bleed easily.   Psychiatric/Behavioral: Negative for agitation, behavioral problems, confusion, decreased concentration, dysphoric mood, hallucinations, self-injury, sleep disturbance and suicidal ideas. The patient is not nervous/anxious and is not hyperactive.         Objective      Physical Exam  Pulse 66   Ht 162.6 cm (64.02\")   Wt 71.1 kg (156 lb 12 oz)   SpO2 98%   BMI 26.89 kg/m²     Body mass index is 26.89 kg/m².    General:   Mental Status:  Alert   Appearance: Cooperative, in no acute distress   Build and Nutrition: Well-nourished well-developed female   Orientation: Alert and oriented to person, place and time   Posture: Normal   Gait: Normal    Upper Extremities:              Right Shoulder:                          Tenderness:    None                          Swelling:          None                          Range of motion:        External rotation:         40°                                                              Forward flexion:          170°                                                              Abduction:                   170°  Deformities:     None  Functional testing: Negative drop arm, negative lift-off, mildly positive impingement      Assessment and Plan     Ernestina was seen today for follow-up.    Diagnoses and all orders for this visit:    Rotator cuff syndrome of right shoulder        1. Rotator cuff syndrome of right shoulder        I reviewed my findings with patient today.  Her right shoulder is tolerable today, and I will see her back for any flareups or problems in the future.    Return if symptoms worsen or fail to improve.        Pito Ordoñez MD  06/24/20  10:51    Dragon disclaimer:  Much of this encounter note is an electronic transcription/translation of spoken language to printed text. The electronic translation of spoken language may permit erroneous, or at times, nonsensical words or phrases to be inadvertently " transcribed; Although I have reviewed the note for such errors, some may still exist.

## 2020-07-13 ENCOUNTER — OFFICE VISIT (OUTPATIENT)
Dept: ORTHOPEDIC SURGERY | Facility: CLINIC | Age: 70
End: 2020-07-13

## 2020-07-13 VITALS — BODY MASS INDEX: 26.76 KG/M2 | OXYGEN SATURATION: 98 % | HEART RATE: 72 BPM | WEIGHT: 156.75 LBS | HEIGHT: 64 IN

## 2020-07-13 DIAGNOSIS — M17.11 OSTEOARTHRITIS OF RIGHT KNEE, UNSPECIFIED OSTEOARTHRITIS TYPE: Primary | ICD-10-CM

## 2020-07-13 DIAGNOSIS — S83.241A TEAR OF MEDIAL MENISCUS OF RIGHT KNEE, UNSPECIFIED TEAR TYPE, UNSPECIFIED WHETHER OLD OR CURRENT TEAR, INITIAL ENCOUNTER: ICD-10-CM

## 2020-07-13 PROCEDURE — 99214 OFFICE O/P EST MOD 30 MIN: CPT | Performed by: ORTHOPAEDIC SURGERY

## 2020-07-13 PROCEDURE — 20610 DRAIN/INJ JOINT/BURSA W/O US: CPT | Performed by: ORTHOPAEDIC SURGERY

## 2020-07-13 RX ORDER — TRIAMCINOLONE ACETONIDE 40 MG/ML
40 INJECTION, SUSPENSION INTRA-ARTICULAR; INTRAMUSCULAR
Status: COMPLETED | OUTPATIENT
Start: 2020-07-13 | End: 2020-07-13

## 2020-07-13 RX ORDER — ROPIVACAINE HYDROCHLORIDE 5 MG/ML
4 INJECTION, SOLUTION EPIDURAL; INFILTRATION; PERINEURAL
Status: COMPLETED | OUTPATIENT
Start: 2020-07-13 | End: 2020-07-13

## 2020-07-13 RX ADMIN — ROPIVACAINE HYDROCHLORIDE 4 ML: 5 INJECTION, SOLUTION EPIDURAL; INFILTRATION; PERINEURAL at 09:29

## 2020-07-13 RX ADMIN — TRIAMCINOLONE ACETONIDE 40 MG: 40 INJECTION, SUSPENSION INTRA-ARTICULAR; INTRAMUSCULAR at 09:29

## 2020-07-13 NOTE — PROGRESS NOTES
[facility] [place] [facility] Note    Subjective     Chief Complaint   Patient presents with   • Right Knee - Pain        HPI    Ernestina Barnhart is a 69 y.o. female who presents with right knee pain.  Onset: atraumatic and gradual in nature. The issue has been ongoing for 1 year(s). Pain is a 7/10 on the pain scale. Pain is described as stabbing. Associated symptoms include pain and swelling. The pain is worse with sitting and sleeping; ice and pain medication and/or NSAID improve the pain. Previous treatments have included: bracing.  No previous injections.  An MRI performed prior to referral.  No mechanical symptoms.    I have reviewed the following portions of the patient's history:History of Present Illness      There is no problem list on file for this patient.    Past Medical History:   Diagnosis Date   • Arthritis    • Reflux esophagitis       Past Surgical History:   Procedure Laterality Date   • CHOLECYSTECTOMY     • HYSTEROSCOPY  1984    [facility]   • ROTATOR CUFF REPAIR      right      History reviewed. No pertinent family history.  Social History     Socioeconomic History   • Marital status:      Spouse name: Not on file   • Number of children: Not on file   • Years of education: Not on file   • Highest education level: Not on file   Tobacco Use   • Smoking status: Never Smoker   • Smokeless tobacco: Never Used   Substance and Sexual Activity   • Alcohol use: No   • Drug use: No   • Sexual activity: Yes      Current Outpatient Medications on File Prior to Visit   Medication Sig Dispense Refill   • cetirizine (zyrTEC) 10 MG tablet Take 10 mg by mouth Daily.     • DULoxetine (CYMBALTA) 30 MG capsule      • estradiol (ESTRACE) 1 MG tablet Take 1 tablet by mouth Daily. 90 tablet 4   • fluticasone (FLONASE) 50 MCG/ACT nasal spray 1 spray into the nostril(s) as directed by provider 2 (Two) Times a Day.     • ipratropium (ATROVENT) 0.03 % nasal spray ipratropium bromide 0.03 % nasal spray   Spray 2  "sprays every day by intranasal route.     • meclizine (ANTIVERT) 25 MG tablet Take 1 tablet by mouth 3 (Three) Times a Day.     • pantoprazole (PROTONIX) 40 MG EC tablet pantoprazole 40 mg tablet,delayed release       No current facility-administered medications on file prior to visit.       Allergies   Allergen Reactions   • Demerol [Meperidine]         Review of Systems   Constitutional: Negative.    HENT: Negative.    Eyes: Negative.    Respiratory: Negative.    Cardiovascular: Negative.    Gastrointestinal: Negative.    Endocrine: Negative.    Genitourinary: Negative.    Musculoskeletal: Positive for arthralgias, back pain and joint swelling.   Skin: Positive for rash.   Allergic/Immunologic: Negative.    Neurological: Negative.    Hematological: Negative.    Psychiatric/Behavioral: Negative.         Objective      Physical Exam  Pulse 72   Ht 162.6 cm (64.02\")   Wt 71.1 kg (156 lb 12 oz)   SpO2 98%   BMI 26.89 kg/m²     Body mass index is 26.89 kg/m².    General:   Mental Status:  Alert   Appearance: Cooperative, in no acute distress   Build and Nutrition: Well-nourished well-developed female   Orientation: Alert and oriented to person, place and time   Posture: Normal   Gait: Normal    Integument:   Right knee: No skin lesions, no rash, no ecchymosis    Neurologic:   Sensation:    Right foot: Intact to light touch on the dorsal and plantar aspect   Motor:  Right lower extremity: 5/5 quadriceps, hamstrings, ankle dorsiflexors, and ankle plantar flexors    Vascular:   Right lower extremity: 2+ dorsalis pedis pulse, prompt capillary refill    Lower Extremities:   Right Knee:    Tenderness:  Medial joint line tenderness, lateral joint line tenderness    Effusion:  None    Swelling:  None    Crepitus:  Positive    Atrophy:  None    Range of motion:  Extension: 0°       Flexion: 130°  Instability:  No varus laxity, no valgus laxity, negative anterior drawer  Deformities:  None      Imaging/Studies  Imaging " Results (Last 24 Hours)     Procedure Component Value Units Date/Time    XR Knee 4+ View Right [[id number]] Resulted:  07/13/20 0921     Updated:  07/13/20 0922    Narrative:       Right Knee Radiographs  Indication: right knee pain  Views: Standing AP's and skiers of both knees, with lateral and sunrise   views of the right knee    Comparison: no prior studies available    Findings:   Calcification within the medial and lateral menisci, with peaking of the   tibial spines, mild joint space narrowing, no acute bony abnormalities,   with good alignment.        MRI of the right knee from [facility], showed mild signal changes in the posterior horn the medial meniscus, consistent with a possible incomplete tear, with mild bone marrow edema in the medial tibial plateau.  MRI was performed on 3/5/2020 of the right knee.    Assessment and Plan     Ernestina was seen today for pain.    Diagnoses and all orders for this visit:    Osteoarthritis of right knee, unspecified osteoarthritis type  -     XR Knee 4+ View Right  -     Large Joint Arthrocentesis: R knee    Tear of medial meniscus of right knee, unspecified tear type, unspecified whether old or current tear, initial encounter        1. Osteoarthritis of right knee, unspecified osteoarthritis type    2. Tear of medial meniscus of right knee, unspecified tear type, unspecified whether old or current tear, initial encounter        I reviewed my findings with the patient today.  She has degeneration of the right knee, and I offered her an intra-articular injection.  She is not having any mechanical symptoms.  I do not believe that arthroscopic intervention will be helpful.  I will see her back in 3 months, but sooner for any problems.  Of note, she had 90% relief just a few minutes following the injection today.    Return in about 3 months (around 10/13/2020).    Medical Decision Making  Management Options : prescription/IM medicine  Data/Risk: radiology  tests and independent visualization of imaging, lab tests, or EMG/NCV      Pito Ordoñez MD  07/13/20  09:49    Dragon disclaimer:  Much of this encounter note is an electronic transcription/translation of spoken language to printed text. The electronic translation of spoken language may permit erroneous, or at times, nonsensical words or phrases to be inadvertently transcribed; Although I have reviewed the note for such errors, some may still exist.

## 2020-07-13 NOTE — PROGRESS NOTES
Procedure   Large Joint Arthrocentesis: R knee  Date/Time: 7/13/2020 9:29 AM  Consent given by: patient  Site marked: site marked  Timeout: Immediately prior to procedure a time out was called to verify the correct patient, procedure, equipment, support staff and site/side marked as required   Supporting Documentation  Indications: pain   Procedure Details  Location: knee - R knee  Preparation: Patient was prepped and draped in the usual sterile fashion  Needle size: 22 G  Approach: anterolateral  Medications administered: 40 mg triamcinolone acetonide 40 MG/ML; 4 mL ropivacaine 0.5 %  Patient tolerance: patient tolerated the procedure well with no immediate complications

## 2020-10-19 ENCOUNTER — OFFICE VISIT (OUTPATIENT)
Dept: ORTHOPEDIC SURGERY | Facility: CLINIC | Age: 70
End: 2020-10-19

## 2020-10-19 VITALS — WEIGHT: 156.75 LBS | HEIGHT: 64 IN | HEART RATE: 80 BPM | OXYGEN SATURATION: 97 % | BODY MASS INDEX: 26.76 KG/M2

## 2020-10-19 DIAGNOSIS — M17.11 OSTEOARTHRITIS OF RIGHT KNEE, UNSPECIFIED OSTEOARTHRITIS TYPE: Primary | ICD-10-CM

## 2020-10-19 DIAGNOSIS — S83.241A TEAR OF MEDIAL MENISCUS OF RIGHT KNEE, UNSPECIFIED TEAR TYPE, UNSPECIFIED WHETHER OLD OR CURRENT TEAR, INITIAL ENCOUNTER: ICD-10-CM

## 2020-10-19 PROCEDURE — 20610 DRAIN/INJ JOINT/BURSA W/O US: CPT | Performed by: ORTHOPAEDIC SURGERY

## 2020-10-19 RX ORDER — ROPIVACAINE HYDROCHLORIDE 5 MG/ML
4 INJECTION, SOLUTION EPIDURAL; INFILTRATION; PERINEURAL
Status: COMPLETED | OUTPATIENT
Start: 2020-10-19 | End: 2020-10-19

## 2020-10-19 RX ORDER — DULOXETIN HYDROCHLORIDE 60 MG/1
CAPSULE, DELAYED RELEASE ORAL
COMMUNITY
Start: 2020-10-02 | End: 2021-06-21

## 2020-10-19 RX ORDER — TRIAMCINOLONE ACETONIDE 40 MG/ML
40 INJECTION, SUSPENSION INTRA-ARTICULAR; INTRAMUSCULAR
Status: COMPLETED | OUTPATIENT
Start: 2020-10-19 | End: 2020-10-19

## 2020-10-19 RX ADMIN — ROPIVACAINE HYDROCHLORIDE 4 ML: 5 INJECTION, SOLUTION EPIDURAL; INFILTRATION; PERINEURAL at 10:32

## 2020-10-19 RX ADMIN — TRIAMCINOLONE ACETONIDE 40 MG: 40 INJECTION, SUSPENSION INTRA-ARTICULAR; INTRAMUSCULAR at 10:32

## 2020-10-19 NOTE — PROGRESS NOTES
Procedure   Large Joint Arthrocentesis: R knee  Date/Time: 10/19/2020 10:32 AM  Consent given by: patient  Site marked: site marked  Timeout: Immediately prior to procedure a time out was called to verify the correct patient, procedure, equipment, support staff and site/side marked as required   Supporting Documentation  Indications: pain   Procedure Details  Location: knee - R knee  Preparation: Patient was prepped and draped in the usual sterile fashion  Needle size: 22 G  Approach: anterolateral  Medications administered: 4 mL ropivacaine 0.5 %; 40 mg triamcinolone acetonide 40 MG/ML  Patient tolerance: patient tolerated the procedure well with no immediate complications

## 2020-10-19 NOTE — PROGRESS NOTES
[facility] [place] [facility] Clinic Note    Subjective     Chief Complaint   Patient presents with   • Follow-up     3 months- Osteoarthritis of right knee        HPI    It has been 3  month(s) since Ms. Barnhart's last visit. She returns to clinic today for follow-up of right knee arthritis. She rates her pain a 8/10 on the pain scale. Previous/current treatments: NSAIDS and steroid injection (last injection 07/2020). Current symptoms: pain and swelling. The pain is worse with sitting; ice and pain medication and/or NSAID improve the pain. Overall, she is doing the same.  She would like another injection today.  Injection provided relief up until about 2 weeks ago.    I have reviewed the following portions of the patient's history and agree with: History of Present Illness and Review of Systems    There is no problem list on file for this patient.    Past Medical History:   Diagnosis Date   • Arthritis    • Reflux esophagitis       Past Surgical History:   Procedure Laterality Date   • CHOLECYSTECTOMY     • HYSTEROSCOPY  1984    SHIMON   • ROTATOR CUFF REPAIR      right      History reviewed. No pertinent family history.  Social History     Socioeconomic History   • Marital status:      Spouse name: Not on file   • Number of children: Not on file   • Years of education: Not on file   • Highest education level: Not on file   Tobacco Use   • Smoking status: Never Smoker   • Smokeless tobacco: Never Used   Substance and Sexual Activity   • Alcohol use: No   • Drug use: No   • Sexual activity: Yes      Current Outpatient Medications on File Prior to Visit   Medication Sig Dispense Refill   • cetirizine (zyrTEC) 10 MG tablet Take 10 mg by mouth Daily.     • DULoxetine (CYMBALTA) 60 MG capsule      • estradiol (ESTRACE) 1 MG tablet Take 1 tablet by mouth Daily. 90 tablet 4   • fluticasone (FLONASE) 50 MCG/ACT nasal spray 1 spray into the nostril(s) as directed by provider 2 (Two) Times a Day.     • ipratropium  (ATROVENT) 0.03 % nasal spray ipratropium bromide 0.03 % nasal spray   Spray 2 sprays every day by intranasal route.     • meclizine (ANTIVERT) 25 MG tablet Take 1 tablet by mouth 3 (Three) Times a Day.     • pantoprazole (PROTONIX) 40 MG EC tablet pantoprazole 40 mg tablet,delayed release     • [DISCONTINUED] DULoxetine (CYMBALTA) 30 MG capsule        No current facility-administered medications on file prior to visit.       Allergies   Allergen Reactions   • Demerol [Meperidine]         Review of Systems   Constitutional: Negative for activity change, appetite change, chills, diaphoresis, fatigue, fever and unexpected weight change.   HENT: Negative for congestion, dental problem, drooling, ear discharge, ear pain, facial swelling, hearing loss, mouth sores, nosebleeds, postnasal drip, rhinorrhea, sinus pressure, sneezing, sore throat, tinnitus, trouble swallowing and voice change.    Eyes: Negative for photophobia, pain, discharge, redness, itching and visual disturbance.   Respiratory: Negative for apnea, cough, choking, chest tightness, shortness of breath, wheezing and stridor.    Cardiovascular: Negative for chest pain, palpitations and leg swelling.   Gastrointestinal: Negative for abdominal distention, abdominal pain, anal bleeding, blood in stool, constipation, diarrhea, nausea, rectal pain and vomiting.   Endocrine: Negative for cold intolerance, heat intolerance, polydipsia, polyphagia and polyuria.   Genitourinary: Negative for decreased urine volume, difficulty urinating, dysuria, enuresis, flank pain, frequency, genital sores, hematuria and urgency.   Musculoskeletal: Positive for arthralgias. Negative for back pain, gait problem, joint swelling, myalgias, neck pain and neck stiffness.   Skin: Negative for color change, pallor, rash and wound.   Allergic/Immunologic: Negative for environmental allergies, food allergies and immunocompromised state.   Neurological: Negative for dizziness, tremors,  "seizures, syncope, facial asymmetry, speech difficulty, weakness, light-headedness, numbness and headaches.   Hematological: Negative for adenopathy. Does not bruise/bleed easily.   Psychiatric/Behavioral: Negative for agitation, behavioral problems, confusion, decreased concentration, dysphoric mood, hallucinations, self-injury, sleep disturbance and suicidal ideas. The patient is not nervous/anxious and is not hyperactive.         Objective      Physical Exam  Pulse 80   Ht 162.6 cm (64.02\")   Wt 71.1 kg (156 lb 12 oz)   SpO2 97%   BMI 26.89 kg/m²     Body mass index is 26.89 kg/m².    General:   Mental Status:  Alert   Appearance: Cooperative, in no acute distress   Build and Nutrition: Well-nourished well-developed female   Orientation: Alert and oriented to person, place and time   Posture: Normal   Gait: Normal    Integument:              Right knee: No skin lesions, no rash, no ecchymosis     Lower Extremities:              Right Knee:                          Tenderness:    Medial joint line tenderness, lateral joint line tenderness                          Effusion:          None                          Swelling:          None                          Crepitus:          Positive                          Atrophy:           None                          Range of motion:        Extension:       0°                                                              Flexion:           130°  Instability:        No varus laxity, no valgus laxity, negative anterior drawer  Deformities:     None      Assessment and Plan     Diagnoses and all orders for this visit:    1. Osteoarthritis of right knee, unspecified osteoarthritis type (Primary)  -     Large Joint Arthrocentesis: R knee    2. Tear of medial meniscus of right knee, unspecified tear type, unspecified whether old or current tear, initial encounter        1. Osteoarthritis of right knee, unspecified osteoarthritis type    2. Tear of medial meniscus of right " knee, unspecified tear type, unspecified whether old or current tear, initial encounter        I reviewed my findings with patient today.  Right knee is bothering her to the point where she would like to have another injection.  Last injection provided relief up until about 2 weeks ago.  She may be a good candidate for viscosupplementation injections in the future.  I will see her back in 4 months, but sooner for any problems.    Procedure Note:  The potential benefits of performing a therapeutic right knee joint injection, as well as potential risks (including, but not limited to infection, swelling, pain, bleeding, bruising, nerve/blood vessel damage, skin color changes, transient elevation in blood glucose levels, and fat atrophy) were discussed with the patient.  After informed consent, timeout procedure was performed, and the skin on the right knee was prepped with chlorhexidine soap and alcohol, after which ethyl chloride was applied to the skin at the injection site. Via the anterolateral approach, 1ml of Kenalog 40mg/ml mixed with 4ml 0.5% ropivacaine plain was injected into the knee joint.  The patient tolerated the procedure well, experiencing 25% improvement a few minutes following the injection. There were no complications.  Band-Aid was applied to the injection site. Post-procedural instructions were given to the patient and/or their caregiver.      Return in about 4 months (around 2/19/2021).    Medical Decision Making  Management Options : prescription/IM medicine        Pito Ordoñez MD  10/19/20  10:52 EDT    Dragon disclaimer:  Much of this encounter note is an electronic transcription/translation of spoken language to printed text. The electronic translation of spoken language may permit erroneous, or at times, nonsensical words or phrases to be inadvertently transcribed; Although I have reviewed the note for such errors, some may still exist.

## 2021-03-31 ENCOUNTER — OFFICE VISIT (OUTPATIENT)
Dept: ORTHOPEDIC SURGERY | Facility: CLINIC | Age: 71
End: 2021-03-31

## 2021-03-31 VITALS
WEIGHT: 161.2 LBS | HEIGHT: 64 IN | HEART RATE: 65 BPM | BODY MASS INDEX: 27.52 KG/M2 | DIASTOLIC BLOOD PRESSURE: 61 MMHG | SYSTOLIC BLOOD PRESSURE: 153 MMHG

## 2021-03-31 DIAGNOSIS — M17.11 OSTEOARTHRITIS OF RIGHT KNEE, UNSPECIFIED OSTEOARTHRITIS TYPE: Primary | ICD-10-CM

## 2021-03-31 PROCEDURE — 99213 OFFICE O/P EST LOW 20 MIN: CPT | Performed by: ORTHOPAEDIC SURGERY

## 2021-03-31 NOTE — PROGRESS NOTES
[facility] Orthopaedic Surgery Clinic Note    Subjective     Chief Complaint   Patient presents with   • Follow-up     5 months follow up for Osteoarthritis of right knee        HPI    Ernestina Barnhart is a 70 y.o. female who follows up for right knee arthritis.    The patient received a steroid injection in her right knee at her last visit on 10/19/2020 and reports that the injection did offer relief. Her right knee pain started approximately 2 years ago and has remained about the same. She reports pain and popping in the right knee and rates her pain at 4/10 on the pain scale. She denies having ever tried viscosupplementation injections before.     I have reviewed the following portions of the patient's history and agree with: History of Present Illness and Review of Systems    There is no problem list on file for this patient.    Past Medical History:   Diagnosis Date   • Arthritis    • Reflux esophagitis       Past Surgical History:   Procedure Laterality Date   • CHOLECYSTECTOMY     • HYSTEROSCOPY  1984    SHIMON   • ROTATOR CUFF REPAIR      right      No family history on file.  Social History     Socioeconomic History   • Marital status:      Spouse name: Not on file   • Number of children: Not on file   • Years of education: Not on file   • Highest education level: Not on file   Tobacco Use   • Smoking status: Never Smoker   • Smokeless tobacco: Never Used   Substance and Sexual Activity   • Alcohol use: No   • Drug use: No   • Sexual activity: Yes      Current Outpatient Medications on File Prior to Visit   Medication Sig Dispense Refill   • cetirizine (zyrTEC) 10 MG tablet Take 10 mg by mouth Daily.     • Diclofenac Sodium (VOLTAREN) 1 % gel gel      • DULoxetine (CYMBALTA) 60 MG capsule      • estradiol (ESTRACE) 1 MG tablet Take 1 tablet by mouth Daily. 90 tablet 4   • fluticasone (FLONASE) 50 MCG/ACT nasal spray 1 spray into the nostril(s) as directed by provider 2 (Two) Times a Day.     • ipratropium  "(ATROVENT) 0.03 % nasal spray ipratropium bromide 0.03 % nasal spray   Spray 2 sprays every day by intranasal route.     • meclizine (ANTIVERT) 25 MG tablet Take 1 tablet by mouth 3 (Three) Times a Day.     • mupirocin (BACTROBAN) 2 % ointment mupirocin 2 % topical ointment     • pantoprazole (PROTONIX) 40 MG EC tablet pantoprazole 40 mg tablet,delayed release       No current facility-administered medications on file prior to visit.      Allergies   Allergen Reactions   • Demerol [Meperidine]         Review of Systems   Constitutional: Negative.    HENT: Negative.    Eyes: Negative.    Respiratory: Negative.    Cardiovascular: Negative.    Gastrointestinal: Negative.    Endocrine: Negative.    Genitourinary: Negative.    Musculoskeletal: Positive for arthralgias.   Skin: Negative.    Allergic/Immunologic: Negative.    Neurological: Negative.    Hematological: Negative.    Psychiatric/Behavioral: Negative.         Objective      Physical Exam  /61   Pulse 65   Ht 162.6 cm (64.02\")   Wt 73.1 kg (161 lb 3.2 oz)   BMI 27.66 kg/m²     Body mass index is 27.66 kg/m².    General:   Mental Status:  Alert   Appearance: Cooperative, in no acute distress   Build and Nutrition: Well-nourished well-developed female   Orientation: Alert and oriented to person, place and time   Posture: Normal   Gait: Mildly antalgic on the right    Integument:              Right knee: No skin lesions, no rash, no ecchymosis     Lower Extremities:              Right Knee:                          Tenderness:    Medial joint line tenderness, lateral joint line tenderness                          Effusion:          None                          Swelling:          None                          Crepitus:          Positive                          Atrophy:           None                          Range of motion:        Extension:       0°                                                              Flexion:           130°  Instability: "        No varus laxity, no valgus laxity, negative anterior drawer  Deformities:     None       Imaging/Studies  No new radiographs.    Assessment and Plan     Diagnoses and all orders for this visit:    1. Osteoarthritis of right knee, unspecified osteoarthritis type (Primary)  -     Large Joint Arthrocentesis        1. Osteoarthritis of right knee, unspecified osteoarthritis type        We discussed treatment options including a repeat steroid injection or a series of viscosupplementation injections in the right knee. I advised her that a series of viscosupplementation injections may offer longer lasting relief than the steroid injection. The patient has elected to proceed with the series of viscosupplementation injections in the right knee for which she will return after they have been approved for administration by her health insurance provider.    Return for After approval of the visco injection.      Scribed for Pito Ordoñez MD by NILA MEYER.  03/31/21   11:38 EDT    I have personally performed the services described in this document as scribed by the above individual, and it is both accurate and complete.  Pito Ordoñez MD  3/31/2021  13:20 EDT

## 2021-04-12 ENCOUNTER — CLINICAL SUPPORT (OUTPATIENT)
Dept: ORTHOPEDIC SURGERY | Facility: CLINIC | Age: 71
End: 2021-04-12

## 2021-04-12 DIAGNOSIS — M17.11 OSTEOARTHRITIS OF RIGHT KNEE, UNSPECIFIED OSTEOARTHRITIS TYPE: Primary | ICD-10-CM

## 2021-04-12 PROCEDURE — 20610 DRAIN/INJ JOINT/BURSA W/O US: CPT | Performed by: ORTHOPAEDIC SURGERY

## 2021-04-12 RX ORDER — HYALURONATE SODIUM 10 MG/ML
20 SYRINGE (ML) INTRAARTICULAR
Status: COMPLETED | OUTPATIENT
Start: 2021-04-12 | End: 2021-04-12

## 2021-04-12 RX ADMIN — Medication 20 MG: at 08:59

## 2021-04-12 NOTE — PROGRESS NOTES
Procedure   Large Joint Arthrocentesis: R knee  Date/Time: 4/12/2021 8:59 AM  Consent given by: patient  Site marked: site marked  Timeout: Immediately prior to procedure a time out was called to verify the correct patient, procedure, equipment, support staff and site/side marked as required   Supporting Documentation  Indications: pain   Procedure Details  Location: knee - R knee  Preparation: Patient was prepped and draped in the usual sterile fashion  Needle size: 23 G  Approach: anterolateral  Medications administered: 20 mg Sodium Hyaluronate 20 MG/2ML  Patient tolerance: patient tolerated the procedure well with no immediate complications

## 2021-04-12 NOTE — PROGRESS NOTES
[facility] [place] [facility] [place] [facility] [place]    Subjective     Chief Complaint   Patient presents with   • Injections     Euflexxa #1 right knee        HPI    Ernestina Barnhart is a 70 y.o. female who presents for the first Euflexxa injection into the right knee.    There is no problem list on file for this patient.    Past Medical History:   Diagnosis Date   • Arthritis    • Reflux esophagitis       Past Surgical History:   Procedure Laterality Date   • CHOLECYSTECTOMY     • HYSTEROSCOPY  1984    SHIMON   • ROTATOR CUFF REPAIR      right      History reviewed. No pertinent family history.  Social History     Socioeconomic History   • Marital status:      Spouse name: Not on file   • Number of children: Not on file   • Years of education: Not on file   • Highest education level: Not on file   Tobacco Use   • Smoking status: Never Smoker   • Smokeless tobacco: Never Used   Substance and Sexual Activity   • Alcohol use: No   • Drug use: No   • Sexual activity: Yes      Current Outpatient Medications on File Prior to Visit   Medication Sig Dispense Refill   • cetirizine (zyrTEC) 10 MG tablet Take 10 mg by mouth Daily.     • Diclofenac Sodium (VOLTAREN) 1 % gel gel      • DULoxetine (CYMBALTA) 60 MG capsule      • estradiol (ESTRACE) 1 MG tablet Take 1 tablet by mouth Daily. 90 tablet 4   • fluticasone (FLONASE) 50 MCG/ACT nasal spray 1 spray into the nostril(s) as directed by provider 2 (Two) Times a Day.     • ipratropium (ATROVENT) 0.03 % nasal spray ipratropium bromide 0.03 % nasal spray   Spray 2 sprays every day by intranasal route.     • meclizine (ANTIVERT) 25 MG tablet Take 1 tablet by mouth 3 (Three) Times a Day.     • mupirocin (BACTROBAN) 2 % ointment mupirocin 2 % topical ointment     • pantoprazole (PROTONIX) 40 MG EC tablet pantoprazole 40 mg tablet,delayed release       No current facility-administered medications on file prior to visit.      Allergies   Allergen Reactions   • Demerol [Meperidine]          Review of Systems   Constitutional: Negative.    HENT: Negative.    Eyes: Negative.    Respiratory: Negative.    Cardiovascular: Negative.    Gastrointestinal: Negative.    Endocrine: Negative.    Genitourinary: Negative.    Musculoskeletal: Positive for arthralgias.   Skin: Negative.    Allergic/Immunologic: Negative.    Neurological: Negative.    Hematological: Negative.    Psychiatric/Behavioral: Negative.         Objective      Physical Exam  There were no vitals taken for this visit.    There is no height or weight on file to calculate BMI.    General:   Mental Status:  Alert   Appearance: Cooperative, in no acute distress   Posture: Normal    Assessment and Plan     Diagnoses and all orders for this visit:    1. Osteoarthritis of right knee, unspecified osteoarthritis type (Primary)        1. Osteoarthritis of right knee, unspecified osteoarthritis type        Procedure Note:  The potential benefits of performing a therapeutic knee joint visco supplementation injection, as well as potential risks (including, but not limited to infection, swelling, pain, bleeding, bruising, nerve/blood vessel damage, and pseudoseptic reaction) have been discussed with the patient.  After informed consent, timeout procedure was performed, and the skin on the right knee was prepped with chlorhexidine soap and alcohol, after which ethyl chloride was applied to the skin at the injection site. Via the anterolateral approach, Euflexxa was injected into the knee joint.  The patient tolerated the procedure well. There were no complications.  Band-Aid was applied to the injection site. Post-procedural instructions discussed with the patient and/or their caregiver.    Return in about 1 week (around 4/19/2021) for Injection.    Pito Ordoñez MD  04/12/21  09:13 EDT

## 2021-04-19 ENCOUNTER — CLINICAL SUPPORT (OUTPATIENT)
Dept: ORTHOPEDIC SURGERY | Facility: CLINIC | Age: 71
End: 2021-04-19

## 2021-04-19 DIAGNOSIS — M17.11 OSTEOARTHRITIS OF RIGHT KNEE, UNSPECIFIED OSTEOARTHRITIS TYPE: Primary | ICD-10-CM

## 2021-04-19 PROCEDURE — 20610 DRAIN/INJ JOINT/BURSA W/O US: CPT | Performed by: ORTHOPAEDIC SURGERY

## 2021-04-19 RX ORDER — DICLOFENAC POTASSIUM 50 MG/1
TABLET, FILM COATED ORAL
COMMUNITY
Start: 2021-04-12

## 2021-04-19 RX ORDER — HYALURONATE SODIUM 10 MG/ML
20 SYRINGE (ML) INTRAARTICULAR
Status: COMPLETED | OUTPATIENT
Start: 2021-04-19 | End: 2021-04-19

## 2021-04-19 RX ADMIN — Medication 20 MG: at 09:19

## 2021-04-19 NOTE — PROGRESS NOTES
Procedure   Large Joint Arthrocentesis: R knee  Date/Time: 4/19/2021 9:19 AM  Consent given by: patient  Site marked: site marked  Timeout: Immediately prior to procedure a time out was called to verify the correct patient, procedure, equipment, support staff and site/side marked as required   Supporting Documentation  Indications: pain   Procedure Details  Location: knee - R knee  Preparation: Patient was prepped and draped in the usual sterile fashion  Needle size: 22 G  Approach: anterolateral  Medications administered: 20 mg Sodium Hyaluronate 20 MG/2ML  Patient tolerance: patient tolerated the procedure well with no immediate complications

## 2021-04-19 NOTE — PROGRESS NOTES
[facility] [place] [facility] Clinic Note    Subjective     Chief Complaint   Patient presents with   • Injections     right knee Euflexxa #2 injection         HPI    Ernestina Barnhart is a 70 y.o. female who presents for the second Euflexxa injection into the right knee.  Of note, she has seen some improvement so far.    There is no problem list on file for this patient.    Past Medical History:   Diagnosis Date   • Arthritis    • Reflux esophagitis       Past Surgical History:   Procedure Laterality Date   • CHOLECYSTECTOMY     • HYSTEROSCOPY  1984    SHIMON   • ROTATOR CUFF REPAIR      right      History reviewed. No pertinent family history.  Social History     Socioeconomic History   • Marital status:      Spouse name: Not on file   • Number of children: Not on file   • Years of education: Not on file   • Highest education level: Not on file   Tobacco Use   • Smoking status: Never Smoker   • Smokeless tobacco: Never Used   Substance and Sexual Activity   • Alcohol use: No   • Drug use: No   • Sexual activity: Yes      Current Outpatient Medications on File Prior to Visit   Medication Sig Dispense Refill   • cetirizine (zyrTEC) 10 MG tablet Take 10 mg by mouth Daily.     • diclofenac (CATAFLAM) 50 MG tablet      • Diclofenac Sodium (VOLTAREN) 1 % gel gel      • DULoxetine (CYMBALTA) 60 MG capsule      • estradiol (ESTRACE) 1 MG tablet Take 1 tablet by mouth Daily. 90 tablet 4   • fluticasone (FLONASE) 50 MCG/ACT nasal spray 1 spray into the nostril(s) as directed by provider 2 (Two) Times a Day.     • ipratropium (ATROVENT) 0.03 % nasal spray ipratropium bromide 0.03 % nasal spray   Spray 2 sprays every day by intranasal route.     • meclizine (ANTIVERT) 25 MG tablet Take 1 tablet by mouth 3 (Three) Times a Day.     • mupirocin (BACTROBAN) 2 % ointment mupirocin 2 % topical ointment     • pantoprazole (PROTONIX) 40 MG EC tablet pantoprazole 40 mg tablet,delayed release       No current facility-administered  medications on file prior to visit.      Allergies   Allergen Reactions   • Demerol [Meperidine]         Review of Systems   Constitutional: Negative.    HENT: Negative.    Eyes: Negative.    Respiratory: Negative.    Cardiovascular: Negative.    Gastrointestinal: Negative.    Endocrine: Negative.    Genitourinary: Negative.    Musculoskeletal: Positive for arthralgias.   Skin: Negative.    Allergic/Immunologic: Negative.    Neurological: Negative.    Hematological: Negative.    Psychiatric/Behavioral: Negative.         Objective      Physical Exam  There were no vitals taken for this visit.    There is no height or weight on file to calculate BMI.    General:   Mental Status:  Alert   Appearance: Cooperative, in no acute distress   Posture: Normal    Assessment and Plan     Diagnoses and all orders for this visit:    1. Osteoarthritis of right knee, unspecified osteoarthritis type (Primary)  -     Large Joint Arthrocentesis: R knee        1. Osteoarthritis of right knee, unspecified osteoarthritis type        Procedure Note:  The potential benefits of performing a therapeutic knee joint visco supplementation injection, as well as potential risks (including, but not limited to infection, swelling, pain, bleeding, bruising, nerve/blood vessel damage, and pseudoseptic reaction) have been discussed with the patient.  After informed consent, timeout procedure was performed, and the skin on the right knee was prepped with chlorhexidine soap and alcohol, after which ethyl chloride was applied to the skin at the injection site. Via the anterolateral approach, Euflexxa was injected into the knee joint.  The patient tolerated the procedure well. There were no complications.  Band-Aid was applied to the injection site. Post-procedural instructions discussed with the patient and/or their caregiver.    Return in about 1 week (around 4/26/2021) for Injection.    Pito Ordoñez MD  04/19/21  09:59 EDT

## 2021-04-26 ENCOUNTER — CLINICAL SUPPORT (OUTPATIENT)
Dept: ORTHOPEDIC SURGERY | Facility: CLINIC | Age: 71
End: 2021-04-26

## 2021-04-26 DIAGNOSIS — M17.11 OSTEOARTHRITIS OF RIGHT KNEE, UNSPECIFIED OSTEOARTHRITIS TYPE: ICD-10-CM

## 2021-04-26 PROCEDURE — 20610 DRAIN/INJ JOINT/BURSA W/O US: CPT | Performed by: ORTHOPAEDIC SURGERY

## 2021-04-26 RX ORDER — HYALURONATE SODIUM 10 MG/ML
20 SYRINGE (ML) INTRAARTICULAR
Status: COMPLETED | OUTPATIENT
Start: 2021-04-26 | End: 2021-04-26

## 2021-04-26 RX ADMIN — Medication 20 MG: at 09:10

## 2021-04-26 NOTE — PROGRESS NOTES
[facility] [place] [facility] Note    Subjective     Chief Complaint   Patient presents with   • Injections     euflexxa #3 right knee        HPI    Ernestina Barnhart is a 70 y.o. female who presents for the third and final Euflexxa injection into the right knee.  Of note, she has seen some improvement to this point.    There is no problem list on file for this patient.    Past Medical History:   Diagnosis Date   • Arthritis    • Reflux esophagitis       Past Surgical History:   Procedure Laterality Date   • CHOLECYSTECTOMY     • HYSTEROSCOPY  1984    SHIMON   • ROTATOR CUFF REPAIR      right      History reviewed. No pertinent family history.  Social History     Socioeconomic History   • Marital status:      Spouse name: Not on file   • Number of children: Not on file   • Years of education: Not on file   • Highest education level: Not on file   Tobacco Use   • Smoking status: Never Smoker   • Smokeless tobacco: Never Used   Substance and Sexual Activity   • Alcohol use: No   • Drug use: No   • Sexual activity: Yes      Current Outpatient Medications on File Prior to Visit   Medication Sig Dispense Refill   • cetirizine (zyrTEC) 10 MG tablet Take 10 mg by mouth Daily.     • diclofenac (CATAFLAM) 50 MG tablet      • Diclofenac Sodium (VOLTAREN) 1 % gel gel      • DULoxetine (CYMBALTA) 60 MG capsule      • estradiol (ESTRACE) 1 MG tablet Take 1 tablet by mouth Daily. 90 tablet 4   • fluticasone (FLONASE) 50 MCG/ACT nasal spray 1 spray into the nostril(s) as directed by provider 2 (Two) Times a Day.     • ipratropium (ATROVENT) 0.03 % nasal spray ipratropium bromide 0.03 % nasal spray   Spray 2 sprays every day by intranasal route.     • meclizine (ANTIVERT) 25 MG tablet Take 1 tablet by mouth 3 (Three) Times a Day.     • mupirocin (BACTROBAN) 2 % ointment mupirocin 2 % topical ointment     • pantoprazole (PROTONIX) 40 MG EC tablet pantoprazole 40 mg tablet,delayed release       No current  facility-administered medications on file prior to visit.      Allergies   Allergen Reactions   • Demerol [Meperidine]         Review of Systems   Constitutional: Negative.    HENT: Negative.    Eyes: Negative.    Respiratory: Negative.    Cardiovascular: Negative.    Gastrointestinal: Negative.    Endocrine: Negative.    Genitourinary: Negative.    Musculoskeletal: Positive for arthralgias.   Skin: Negative.    Allergic/Immunologic: Negative.    Neurological: Negative.    Hematological: Negative.    Psychiatric/Behavioral: Negative.         Objective      Physical Exam  There were no vitals taken for this visit.    There is no height or weight on file to calculate BMI.    General:   Mental Status:  Alert   Appearance: Cooperative, in no acute distress   Posture: Normal    Assessment and Plan     Diagnoses and all orders for this visit:    1. Osteoarthritis of right knee, unspecified osteoarthritis type  -     Large Joint Arthrocentesis: R knee        1. Osteoarthritis of right knee, unspecified osteoarthritis type        Procedure Note:  The potential benefits of performing a therapeutic knee joint visco supplementation injection, as well as potential risks (including, but not limited to infection, swelling, pain, bleeding, bruising, nerve/blood vessel damage, and pseudoseptic reaction) have been discussed with the patient.  After informed consent, timeout procedure was performed, and the skin on the right knee was prepped with chlorhexidine soap and alcohol, after which ethyl chloride was applied to the skin at the injection site. Via the anterolateral approach, Euflexxa was injected into the knee joint.  The patient tolerated the procedure well. There were no complications.  Band-Aid was applied to the injection site. Post-procedural instructions discussed with the patient and/or their caregiver.    Return in about 6 months (around 10/26/2021).    Pito Ordoñez MD  04/26/21  09:33 EDT

## 2021-04-26 NOTE — PROGRESS NOTES
Procedure   Large Joint Arthrocentesis: R knee  Date/Time: 4/26/2021 9:10 AM  Consent given by: patient  Site marked: site marked  Timeout: Immediately prior to procedure a time out was called to verify the correct patient, procedure, equipment, support staff and site/side marked as required   Supporting Documentation  Indications: pain   Procedure Details  Location: knee - R knee  Preparation: Patient was prepped and draped in the usual sterile fashion  Needle size: 23 G  Approach: anterolateral  Medications administered: 20 mg Sodium Hyaluronate 20 MG/2ML  Patient tolerance: patient tolerated the procedure well with no immediate complications

## 2021-06-21 ENCOUNTER — OFFICE VISIT (OUTPATIENT)
Dept: ORTHOPEDIC SURGERY | Facility: CLINIC | Age: 71
End: 2021-06-21

## 2021-06-21 VITALS
WEIGHT: 156 LBS | HEIGHT: 64 IN | HEART RATE: 67 BPM | DIASTOLIC BLOOD PRESSURE: 57 MMHG | BODY MASS INDEX: 26.63 KG/M2 | SYSTOLIC BLOOD PRESSURE: 140 MMHG

## 2021-06-21 DIAGNOSIS — M17.11 OSTEOARTHRITIS OF RIGHT KNEE, UNSPECIFIED OSTEOARTHRITIS TYPE: Primary | ICD-10-CM

## 2021-06-21 PROCEDURE — 20610 DRAIN/INJ JOINT/BURSA W/O US: CPT | Performed by: PHYSICIAN ASSISTANT

## 2021-06-21 PROCEDURE — 99213 OFFICE O/P EST LOW 20 MIN: CPT | Performed by: PHYSICIAN ASSISTANT

## 2021-06-21 RX ADMIN — TRIAMCINOLONE ACETONIDE 40 MG: 40 INJECTION, SUSPENSION INTRA-ARTICULAR; INTRAMUSCULAR at 11:46

## 2021-06-21 RX ADMIN — LIDOCAINE HYDROCHLORIDE 4 ML: 10 INJECTION, SOLUTION EPIDURAL; INFILTRATION; INTRACAUDAL; PERINEURAL at 11:46

## 2021-06-21 NOTE — PROGRESS NOTES
Procedure   Large Joint Arthrocentesis: R knee  Date/Time: 6/21/2021 11:46 AM  Consent given by: patient  Site marked: site marked  Timeout: Immediately prior to procedure a time out was called to verify the correct patient, procedure, equipment, support staff and site/side marked as required   Supporting Documentation  Indications: pain   Procedure Details  Location: knee - R knee  Preparation: Patient was prepped and draped in the usual sterile fashion  Needle size: 23 G  Approach: anterolateral  Medications administered: 40 mg triamcinolone acetonide 40 MG/ML; 4 mL lidocaine PF 1% 1 %  Patient tolerance: patient tolerated the procedure well with no immediate complications

## 2021-06-21 NOTE — PROGRESS NOTES
"    [facility] [place] [facility] Note        Subjective     CC: Follow-up (8 week recheck - Osteoarthritis of right knee)      HPI    Ernestina Barnhart is a 70 y.o. female.  Patient returns today for follow-up evaluation of her right knee.  She had previously seen Dr. Ordoñez back in March 2021 and then after that appointment proceeded with viscosupplementation series.  She states the gel injections provided no benefit or improvement with regards to her pain.  She is here today to see if we can proceed with a corticosteroid injection.  Her last corticosteroid injection was in October 2020.    This time she endorses a pain scale of 5/10.  Associated symptoms swelling and stiffness.  Symptoms are worse with walking, sitting, sleeping and working.  No reported numbness or tingling into the extremity.    Overall, patient's symptoms are unchanged or worsening with exacerbation of right knee pain.    ROS:    Constiutional:Pt denies fever, chills, nausea, or vomiting.  MSK:as above        Objective      Past Medical History  Past Medical History:   Diagnosis Date   • Arthritis    • Reflux esophagitis          Physical Exam  /57   Pulse 67   Ht 162.6 cm (64.02\")   Wt 70.8 kg (156 lb)   BMI 26.76 kg/m²     Body mass index is 26.76 kg/m².    Patient is well nourished and well developed.        Ortho Exam  Right knee  Skin: Grossly intact without any redness, warmth or swelling.  Tenderness: Medial joint line as well as posterior knee/capsular pain.  Motion: 0-125 degrees with crepitus appreciable  Testing: Varus and valgus stress test along with Lachman's all negative.  Motor/sensory: Grossly intact L2-S1.      Imaging/Labs/EMG Reviewed:  No new imaging today.  I did want to proceed with repeat right knee plain films since has been nearly a year since her last set of films but patient declined to have them done today until she can contact her insurance and find out if it is a covered benefit.      Assessment:  1. " Osteoarthritis of right knee, unspecified osteoarthritis type        Plan:  Right knee pain due to osteoarthritis.  Offered and accepted corticosteroid injection.  Injection was given today.  Recommend over-the-counter pain medication as needed.  Patient to follow-up in 4 months for repeat evaluation.  By then she should know whether her insurance covers repeat injections.  If it does then we would like to proceed with a new set of films to evaluate progression of the arthritis.  Questions and concerns answered.    After discussing the risks, benefits, indications of injection, the patient gave consent to proceed.  Her right knee was confirmed as the correct joint to be injected with a timeout.  It was then prepped using Hibiclens and injected with a mixture of 4 cc of 1% plain lidocaine and 1 cc of Kenalog (40 mg per mL), without any resistance through the anterior lateral approach, patient in seated position.  Area was cleaned, hemostasis was achieved and a Band-Aid was applied over the injection site.  The patient tolerated procedure well.  I instructed the patient on signs and symptoms of infection.  They should report to the emergency department or return to clinic if any of these develop, for further evaluation and treatment.  Recommended modifying activity for the next 48 hours to include rest, ice, elevation and oral pain medication as needed.        Sara Waldron PA-C  06/24/21  13:44 DEREKT      Dragon disclaimer:  Much of this encounter note is an electronic transcription/translation of spoken language to printed text. The electronic translation of spoken language may permit erroneous, or at times, nonsensical words or phrases to be inadvertently transcribed; Although I have reviewed the note for such errors, some may still exist.

## 2021-06-22 ENCOUNTER — TELEPHONE (OUTPATIENT)
Dept: ORTHOPEDIC SURGERY | Facility: CLINIC | Age: 71
End: 2021-06-22

## 2021-06-22 NOTE — TELEPHONE ENCOUNTER
Provider: ENRRIQUE DICKINSON    Caller: PATIENT    Relationship to Patient: SELF      Phone Number: [phone]    Reason for Call: PT. STATES THAT SHE HAS FOLLOW UP APPT. FOR HER RIGHT KNEE IN October.   STATES THAT ENRRIQUE TOLD HER TO CALL HER INSURANCE ABOUT THE XRAY TO SEE IF IT WAS COVERED.   PT. STATES THAT SHE CALLED HER INSURANCE AND THEY WILL COVER THIS.   STATES THAT THE INSURANCE TOLD HER THAT OFFICE NEEDS TO CALL THEM TO LET THEM KNOW WHAT KIND OF XRAY, ETC.   [phone].   PLEASE ADVISE.

## 2021-06-22 NOTE — TELEPHONE ENCOUNTER
I attempted to contact the patient in regards to the message below. The phone rang busy and did not give an option to leave a voicemail. If the patient does call back in the office at any point inquiring about us calling her insurance in regards to xrays, please advise her that we do not handle this and the insurance company should be the ones who will get the bill in regards to the xrays and what the xrays are of.     Diya BARR

## 2021-06-24 RX ORDER — LIDOCAINE HYDROCHLORIDE 10 MG/ML
4 INJECTION, SOLUTION EPIDURAL; INFILTRATION; INTRACAUDAL; PERINEURAL
Status: COMPLETED | OUTPATIENT
Start: 2021-06-21 | End: 2021-06-21

## 2021-06-24 RX ORDER — TRIAMCINOLONE ACETONIDE 40 MG/ML
40 INJECTION, SUSPENSION INTRA-ARTICULAR; INTRAMUSCULAR
Status: COMPLETED | OUTPATIENT
Start: 2021-06-21 | End: 2021-06-21

## 2021-10-25 ENCOUNTER — OFFICE VISIT (OUTPATIENT)
Dept: ORTHOPEDIC SURGERY | Facility: CLINIC | Age: 71
End: 2021-10-25

## 2021-10-25 VITALS — HEIGHT: 64 IN | BODY MASS INDEX: 27.35 KG/M2 | WEIGHT: 160.2 LBS

## 2021-10-25 DIAGNOSIS — M25.561 CHRONIC PAIN OF RIGHT KNEE: ICD-10-CM

## 2021-10-25 DIAGNOSIS — M17.11 OSTEOARTHRITIS OF RIGHT KNEE, UNSPECIFIED OSTEOARTHRITIS TYPE: Primary | ICD-10-CM

## 2021-10-25 DIAGNOSIS — S83.241D TEAR OF MEDIAL MENISCUS OF RIGHT KNEE, UNSPECIFIED TEAR TYPE, UNSPECIFIED WHETHER OLD OR CURRENT TEAR, SUBSEQUENT ENCOUNTER: ICD-10-CM

## 2021-10-25 DIAGNOSIS — G89.29 CHRONIC PAIN OF RIGHT KNEE: ICD-10-CM

## 2021-10-25 PROCEDURE — 99213 OFFICE O/P EST LOW 20 MIN: CPT | Performed by: PHYSICIAN ASSISTANT

## 2021-10-25 PROCEDURE — 20610 DRAIN/INJ JOINT/BURSA W/O US: CPT | Performed by: PHYSICIAN ASSISTANT

## 2021-10-25 RX ORDER — PRAMIPEXOLE DIHYDROCHLORIDE 0.5 MG/1
TABLET ORAL
COMMUNITY
Start: 2021-08-27 | End: 2022-02-28

## 2021-10-25 RX ORDER — DULOXETIN HYDROCHLORIDE 60 MG/1
CAPSULE, DELAYED RELEASE ORAL
COMMUNITY
Start: 2021-08-27 | End: 2021-12-15

## 2021-10-25 RX ADMIN — TRIAMCINOLONE ACETONIDE 40 MG: 40 INJECTION, SUSPENSION INTRA-ARTICULAR; INTRAMUSCULAR at 12:12

## 2021-10-25 RX ADMIN — LIDOCAINE HYDROCHLORIDE 4 ML: 10 INJECTION, SOLUTION EPIDURAL; INFILTRATION; INTRACAUDAL; PERINEURAL at 12:12

## 2021-10-25 NOTE — PROGRESS NOTES
Procedure   Large Joint Arthrocentesis: R knee  Date/Time: 10/25/2021 12:12 PM  Consent given by: patient  Site marked: site marked  Timeout: Immediately prior to procedure a time out was called to verify the correct patient, procedure, equipment, support staff and site/side marked as required   Supporting Documentation  Indications: pain   Procedure Details  Location: knee - R knee  Preparation: Patient was prepped and draped in the usual sterile fashion  Needle size: 22 G  Approach: anterolateral  Medications administered: 4 mL lidocaine PF 1% 1 %; 40 mg triamcinolone acetonide 40 MG/ML  Patient tolerance: patient tolerated the procedure well with no immediate complications

## 2021-10-25 NOTE — PROGRESS NOTES
"    [facility] [place] [facility] [place]        Subjective     CC: Follow-up (4 month f/u--Osteoarthritis of right knee)      HPI    Ernestina Barnhart is a 71 y.o. female.  Patient returns today for follow-up evaluation of her right knee.  She was given a corticosteroid injection last visit 6/21/2021 which she states helps for about 3 to 4 months.  She is also had viscosupplementation series in the past which provided no relief of symptoms.  Discussion was had with patient regarding treatment options.  She is uninterested in any surgical intervention.    Currently she endorses a pain scale 6/10.  She notes swelling and stiffness to the knee.  No reported numbness or tingling into the extremity.  No mechanical symptoms to the knee such as locking or catching.    Overall, patient's symptoms are worsening as corticosteroid injection has worn off.    ROS:    Constiutional:Pt denies fever, chills, nausea, or vomiting.  MSK:as above        Objective      Past Medical History  Past Medical History:   Diagnosis Date   • Arthritis    • Reflux esophagitis          Physical Exam  Ht 162.6 cm (64.02\")   Wt 72.7 kg (160 lb 3.2 oz)   BMI 27.48 kg/m²     Body mass index is 27.48 kg/m².    Patient is well nourished and well developed.        Ortho Exam  Right knee  Alignment: Genu varum  Skin: Grossly intact without any redness, warmth or swelling.  Tenderness: Medial joint line as well as posterior knee/capsular pain.  Motion: 0-125 degrees with crepitus appreciable  Testing: Varus and valgus stress test along with Lachman's all negative.  Motor/sensory: Grossly intact L2-S1.      Imaging/Labs/EMG Reviewed:  Ordered right knee plain films.  Imaging read/interpreted by Dr. Ordoñez    Right Knee Radiographs  Indication: right knee pain  Views: Standing AP's and skiers of both knees, with lateral and sunrise views of the right knee     Comparison: 7/13/2020     Findings:   Medial joint space narrowing, peaking of the tibial spines, " calcification within the medial lateral menisci, no acute bony abnormalities.  No significant worsening compared to the previous imaging.      Assessment:  1. Osteoarthritis of right knee, unspecified osteoarthritis type    2. Chronic pain of right knee        Plan:  Right knee osteoarthritis, degenerative medial meniscal tear causing chronic pain.  Patient uninterested in proceeding with any surgical intervention.  Offered and accepted corticosteroid injection to the right knee.  Injection was given today.  Patient to continue with over-the-counter pain medication.  Follow-up in 4 months for repeat evaluation.  Questions and concerns answered.    After discussing the risks, benefits, indications of injection, the patient gave consent to proceed.  Her right knee was confirmed as the correct joint to be injected with a timeout.  It was then prepped using Hibiclens and injected with a mixture of 4 cc of 1% plain lidocaine and 1 cc of Kenalog (40 mg per mL), without any resistance through the anterior lateral approach, patient in seated position.  Area was cleaned, hemostasis was achieved and a Band-Aid was applied over the injection site.  The patient tolerated procedure well.  I instructed the patient on signs and symptoms of infection.  They should report to the emergency department or return to clinic if any of these develop, for further evaluation and treatment.  Recommended modifying activity for the next 48 hours to include rest, ice, elevation and oral pain medication as needed.        Sara Waldron PA-C  10/29/21  08:25 DEREKT      Dragon disclaimer:  Much of this encounter note is an electronic transcription/translation of spoken language to printed text. The electronic translation of spoken language may permit erroneous, or at times, nonsensical words or phrases to be inadvertently transcribed; Although I have reviewed the note for such errors, some may still exist.

## 2021-10-29 RX ORDER — LIDOCAINE HYDROCHLORIDE 10 MG/ML
4 INJECTION, SOLUTION EPIDURAL; INFILTRATION; INTRACAUDAL; PERINEURAL
Status: COMPLETED | OUTPATIENT
Start: 2021-10-25 | End: 2021-10-25

## 2021-10-29 RX ORDER — TRIAMCINOLONE ACETONIDE 40 MG/ML
40 INJECTION, SUSPENSION INTRA-ARTICULAR; INTRAMUSCULAR
Status: COMPLETED | OUTPATIENT
Start: 2021-10-25 | End: 2021-10-25

## 2021-11-15 ENCOUNTER — TELEPHONE (OUTPATIENT)
Dept: ORTHOPEDIC SURGERY | Facility: CLINIC | Age: 71
End: 2021-11-15

## 2021-11-15 NOTE — TELEPHONE ENCOUNTER
Called to schedule appt for a possible injection in RT shoulder. No VM available.     HUB OKAY TO SCHEDULE AFTER 1-26-22 WITH DR GEE

## 2021-11-15 NOTE — TELEPHONE ENCOUNTER
Caller: AKOSUA YOUSSEF     Relationship to patient: SELF     Best call back [phone]    Type of visit: RIGHT SHOULDER INJECTION     Requested date: PREFERS TUESDAYS OR FRIDAYS

## 2021-12-09 ENCOUNTER — TELEPHONE (OUTPATIENT)
Dept: ORTHOPEDIC SURGERY | Facility: CLINIC | Age: 71
End: 2021-12-09

## 2021-12-09 NOTE — TELEPHONE ENCOUNTER
Caller: AKOSUA YOUSSEF    Relationship to patient: SELF    Best call back number: [phone]    Chief complaint: RIGHT SHOULDER PAIN    Type of visit: INJECTION    Requested date: 12/15/2021     If rescheduling, when is the original appointment:     Additional notes:PATIENT HAS OTHER APPT IN COLTEN ON 12/15/2021- REQ APPT ON THAT DATE AROUND OR AFTER 10:30 IF POSSIBLE

## 2021-12-09 NOTE — TELEPHONE ENCOUNTER
Tried to call Pt several times to inform her that Dr Ordoñez only has 2 appt's avalable for 12-15-21 @3:20 and 4:00. No VM available

## 2021-12-10 ENCOUNTER — TELEPHONE (OUTPATIENT)
Dept: ORTHOPEDIC SURGERY | Facility: CLINIC | Age: 71
End: 2021-12-10

## 2021-12-10 NOTE — TELEPHONE ENCOUNTER
ATTEMPTED TO WARM TRANSFER    Caller: AKOSUA YOUSSEF    Relationship: PATIENT    Best call back number: [phone]    What is the best time to reach you: TODAY    Who are you requesting to speak with (clinical staff, provider,  specific staff member): STAFF    Do you know the name of the person who called: April TRIED TO CALL-NO VM     What was the call regarding: SCHEDULING RIGHT SHOULDER INJECTION    Do you require a callback: YES

## 2021-12-10 NOTE — TELEPHONE ENCOUNTER
Caller: AKOSUA YOUSSEF    Relationship to patient: SELF     Best call back number: [phone]    Patient is needing: PATIENT WAS CALLING TO CHECK ON THE STATUS OF THE SCHEDULING.

## 2021-12-10 NOTE — TELEPHONE ENCOUNTER
Patient called back 2nd time for status of getting appointment, Wednesday 12/15/21.  Per patient her pain is worse and the last inj lasted and really helped.  Per patient she will be in town Wednesday, that's why she is requesting then.  Please return her call.

## 2021-12-13 ENCOUNTER — TELEPHONE (OUTPATIENT)
Dept: ORTHOPEDIC SURGERY | Facility: CLINIC | Age: 71
End: 2021-12-13

## 2021-12-13 NOTE — TELEPHONE ENCOUNTER
Attempted to contact Pt several times and the line is busy.         HUB OKAY TO SCHEDULE NEXT AVAILABLE FOR A FOLLOW UP WITH DR GEE

## 2021-12-13 NOTE — TELEPHONE ENCOUNTER
ATTEMPTED TO WARM TRANSFER     Caller: AKOSUA YOUSSEF     Relationship: PATIENT     Best call back number: [phone]      Do you know the name of the person who called: April     What was the call regarding: SCHEDULING RIGHT SHOULDER INJECTION - PT SAYS THAT April TOLD HER THAT SHE COULD CALL TO DISCUSS. PATIENT IS ATTEMPTING TO CALL April BACK.    THE INJECTION THAT WAS RECEIVED IN October WAS IN THE PATIENT'S KNEE AND NOT HER SHOULDER.      Do you require a callback: YES

## 2021-12-13 NOTE — TELEPHONE ENCOUNTER
Called and spoke with Pt's  and informed him that Pt can not receive another Cortisone injection until January 26,2022. Advised him to have Pt call the office if she still needs to be seen

## 2021-12-14 ENCOUNTER — TELEPHONE (OUTPATIENT)
Dept: ORTHOPEDIC SURGERY | Facility: CLINIC | Age: 71
End: 2021-12-14

## 2021-12-14 NOTE — TELEPHONE ENCOUNTER
April-are you able to see about scheduling this pt with MEK tomorrow?    It looks like he has a couple openings possibly. She has not had a subacromial injection in almost a year so she should be okay to come in for this. Thanks!    Ivis

## 2021-12-15 ENCOUNTER — OFFICE VISIT (OUTPATIENT)
Dept: ORTHOPEDIC SURGERY | Facility: CLINIC | Age: 71
End: 2021-12-15

## 2021-12-15 VITALS
DIASTOLIC BLOOD PRESSURE: 80 MMHG | SYSTOLIC BLOOD PRESSURE: 130 MMHG | BODY MASS INDEX: 27.96 KG/M2 | HEIGHT: 64 IN | WEIGHT: 163.8 LBS

## 2021-12-15 DIAGNOSIS — M75.101 ROTATOR CUFF SYNDROME, RIGHT: Primary | ICD-10-CM

## 2021-12-15 PROCEDURE — 99214 OFFICE O/P EST MOD 30 MIN: CPT | Performed by: ORTHOPAEDIC SURGERY

## 2021-12-15 NOTE — PROGRESS NOTES
[facility] [place] [facility] Note    Subjective     Chief Complaint   Patient presents with   • Follow-up     Right Shoulder Pain         HPI    It has been 18  month(s) since Ms. Barnhart's last visit. She returns to clinic today for follow-up of right shoulder pain. The issue has been ongoing for 24 year(s) with a gradual increase for 4 weeks without event. She rates her pain a 5/10 on the pain scale. Previous/current treatments: NSAIDS, physical therapy, subacromial injection 1/27/20 . Current symptoms: pain and stiffness. The pain is worse with sleeping, rising from seated position and any movement of the joint; ice, heat and pain medication and/or NSAID improve the pain. Overall, she is doing better.  Previous surgery with a rotator cuff repair in 1997 with Dr. Arroyo.  Previous MRI in January 2019, with torn rotator cuff.  Subacromial injections in the past, with good relief.    I have reviewed the following portions of the patient's history and agree with: History of Present Illness and Review of Systems    There is no problem list on file for this patient.    Past Medical History:   Diagnosis Date   • Arthritis    • Reflux esophagitis       Past Surgical History:   Procedure Laterality Date   • CHOLECYSTECTOMY     • HYSTEROSCOPY  1984    SHIMON   • ROTATOR CUFF REPAIR      right   • SKIN CANCER EXCISION Right     shoulder      History reviewed. No pertinent family history.  Social History     Socioeconomic History   • Marital status:    Tobacco Use   • Smoking status: Never Smoker   • Smokeless tobacco: Never Used   Substance and Sexual Activity   • Alcohol use: No   • Drug use: No   • Sexual activity: Yes      Current Outpatient Medications on File Prior to Visit   Medication Sig Dispense Refill   • cetirizine (zyrTEC) 10 MG tablet Take 10 mg by mouth Daily.     • diclofenac (CATAFLAM) 50 MG tablet      • Diclofenac Sodium (VOLTAREN) 1 % gel gel      • estradiol (ESTRACE) 1 MG tablet Take 1 tablet  "by mouth Daily. 90 tablet 4   • fluticasone (FLONASE) 50 MCG/ACT nasal spray 1 spray into the nostril(s) as directed by provider 2 (Two) Times a Day.     • ipratropium (ATROVENT) 0.03 % nasal spray ipratropium bromide 0.03 % nasal spray   Spray 2 sprays every day by intranasal route.     • meclizine (ANTIVERT) 25 MG tablet Take 1 tablet by mouth 3 (Three) Times a Day.     • pantoprazole (PROTONIX) 40 MG EC tablet pantoprazole 40 mg tablet,delayed release     • pramipexole (MIRAPEX) 0.5 MG tablet      • [DISCONTINUED] DULoxetine (CYMBALTA) 60 MG capsule        No current facility-administered medications on file prior to visit.      Allergies   Allergen Reactions   • Demerol [Meperidine]         Review of Systems   HENT: Positive for hearing loss.    Eyes: Positive for itching.   Musculoskeletal: Positive for arthralgias, back pain and joint swelling.   Hematological: Bruises/bleeds easily.   Psychiatric/Behavioral: Positive for sleep disturbance.   All other systems reviewed and are negative.       Objective      Physical Exam  /80   Ht 162.6 cm (64.02\")   Wt 74.3 kg (163 lb 12.8 oz)   BMI 28.10 kg/m²     Body mass index is 28.1 kg/m².    General:   Mental Status:  Alert   Appearance: Cooperative, in no acute distress   Build and Nutrition: Well-nourished well-developed female   Orientation: Alert and oriented to person, place and time   Posture: Normal   Gait: Normal    Integument:   Right shoulder: No skin lesions, no rash, no ecchymosis    Neurologic:   Sensation:    Right hand: Intact to light touch in the digits   Motor:  Right upper extremity: 5/5 deltoid, biceps, triceps, wrist flexors, wrist extensors, and interossei    Vascular:   Right upper extremity: 2+ radial pulse, prompt capillary refill    Upper Extremities:   Right Shoulder:    Tenderness:  None    Swelling:  None    Crepitus: None    Atrophy:  None    Range of motion:  External rotation:  30°       Forward flexion:  150°       Abduction: "   120°  Instability:  None  Deformities:  None  Functional testing: Negative drop arm, negative lift-off, positive impingement      Imaging/Studies  Imaging Results (Last 24 Hours)     Procedure Component Value Units Date/Time    XR Shoulder 2+ View Right [[id number]] Resulted: 12/15/21 1121     Updated: 12/15/21 1122    Narrative:      Right Shoulder Radiographs  Indication: right shoulder pain  Views: AP, outlet and axillary views of the right shoulder    Comparison: 1/27/2020    Findings:  Calcifications at the greater tuberosity region, consistent with calcific   tendinitis, with subacromial spurring, acromioclavicular joint   degeneration, with no acute bony abnormalities.  No unusual bony features.              Assessment and Plan     Diagnoses and all orders for this visit:    1. Rotator cuff syndrome, right (Primary)  -     XR Shoulder 2+ View Right        1. Rotator cuff syndrome, right        I reviewed my findings with the patient.  She continues to have pain in her right shoulder, and has a rotator cuff tear is seen on her previous MRI in 2019.  She has exhausted conservative treatment.  I would like for her to see Dr. Ochoa for further evaluation and treatment if indicated.    Return for Referral to Dr. Ochoa.      Pito Ordoñez MD  12/15/21  11:29 EST

## 2021-12-28 ENCOUNTER — OFFICE VISIT (OUTPATIENT)
Dept: ORTHOPEDIC SURGERY | Facility: CLINIC | Age: 71
End: 2021-12-28

## 2021-12-28 VITALS
WEIGHT: 162 LBS | SYSTOLIC BLOOD PRESSURE: 122 MMHG | HEIGHT: 64 IN | DIASTOLIC BLOOD PRESSURE: 74 MMHG | BODY MASS INDEX: 27.66 KG/M2

## 2021-12-28 DIAGNOSIS — Z98.890 STATUS POST COMPLETE REPAIR OF ROTATOR CUFF: ICD-10-CM

## 2021-12-28 DIAGNOSIS — M75.51 BURSITIS OF RIGHT SHOULDER: ICD-10-CM

## 2021-12-28 DIAGNOSIS — M75.121 NONTRAUMATIC COMPLETE TEAR OF RIGHT ROTATOR CUFF: Primary | ICD-10-CM

## 2021-12-28 DIAGNOSIS — M75.21 BICEPS TENDINITIS OF RIGHT UPPER EXTREMITY: ICD-10-CM

## 2021-12-28 DIAGNOSIS — M75.41 IMPINGEMENT SYNDROME OF RIGHT SHOULDER: ICD-10-CM

## 2021-12-28 PROCEDURE — 99214 OFFICE O/P EST MOD 30 MIN: CPT | Performed by: ORTHOPAEDIC SURGERY

## 2021-12-28 NOTE — PROGRESS NOTES
[facility] [place] [facility] Office Visit - Jose Ochoa MD    Office Visit       Patient Name: Ernestina Barnhart    Chief Complaint:   Chief Complaint   Patient presents with   • Right Shoulder - Pain       Referring Physician: Dr. Pito Ordoñez-appreciate the referral      History of Present Illness:   Ernestina Barnhart is a 71 y.o. female who presents with right body part: shoulder Reason: pain.  Onset:Onset: atraumatic and gradual in nature. The issue has been ongoing for 20 year(s). Pain is a 6/10 on the pain scale. Pain is described as Pain Characterization: dull, aching and stabbing. Associated symptoms include Symptoms: pain and stiffness. The pain is worse with sleeping, working and lying on affected side; ice, heat and pain medication and/or NSAID improve the pain. Previous treatments have included: NSAIDS, physical therapy and steroid injection (last injection 1-2020).  I have reviewed the patient's history of present illness as noted/entered above.    I have reviewed the patient's past medical history, surgical history, social history, family history, medications, and allergies as noted in the electronic medical record and as noted/entered.  I have reviewed the patient's review of systems as noted/enter and updated as noted in the patient's HPI.    Right shoulder chronic pain and dating up to 20 years.  Surgery with Dr. 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 Arroyo in 1997  Mechanism of Injury at Work: reaching up and injured  Current Work Status: retired  Treatments: w/c surgery      Subjective   Subjective      Review of Systems   Constitutional: Negative.  Negative for chills, fatigue and fever.   HENT: Negative.  Negative for congestion and dental problem.    Eyes: Negative.  Negative for blurred vision.   Respiratory: Negative.  Negative for shortness of breath.    Cardiovascular: Negative.  Negative for leg swelling.   Gastrointestinal: Negative.  Negative for abdominal pain.   Endocrine: Negative.  Negative for polyuria.   Genitourinary: Negative.  Negative for difficulty urinating.   Musculoskeletal: Positive for arthralgias.   Skin: Negative.    Allergic/Immunologic: Negative.    Neurological: Negative.    Hematological: Negative.  Negative for adenopathy.   Psychiatric/Behavioral: Negative.  Negative for behavioral problems.        Past Medical History:   Past Medical History:   Diagnosis Date   • Arthritis    • Reflux esophagitis        Past Surgical History:   Past Surgical History:   Procedure Laterality Date   • CHOLECYSTECTOMY     • HYSTEROSCOPY  1984    [facility]   • ROTATOR CUFF REPAIR      right   • SKIN CANCER EXCISION Right     shoulder       Family History: No family history on file.    Social History:   Social History     Socioeconomic History   • Marital status:    Tobacco Use   • Smoking status: Never Smoker   • Smokeless tobacco: Never Used   Substance and Sexual Activity   • Alcohol use: No   • Drug use: No   • Sexual activity: Yes       Medications:   Current Outpatient Medications:   •  cetirizine (zyrTEC) 10 MG tablet, Take 10 mg by mouth Daily., Disp: , Rfl:   •  diclofenac (CATAFLAM) 50 MG tablet, , Disp: , Rfl:   •  Diclofenac Sodium (VOLTAREN) 1 % gel gel, , Disp: , Rfl:   •  estradiol (ESTRACE) 1 MG tablet, Take 1 tablet by mouth Daily., Disp: 90  "tablet, Rfl: 4  •  fluticasone (FLONASE) 50 MCG/ACT nasal spray, 1 spray into the nostril(s) as directed by provider 2 (Two) Times a Day., Disp: , Rfl:   •  ipratropium (ATROVENT) 0.03 % nasal spray, ipratropium bromide 0.03 % nasal spray  Spray 2 sprays every day by intranasal route., Disp: , Rfl:   •  meclizine (ANTIVERT) 25 MG tablet, Take 1 tablet by mouth 3 (Three) Times a Day., Disp: , Rfl:   •  pantoprazole (PROTONIX) 40 MG EC tablet, pantoprazole 40 mg tablet,delayed release, Disp: , Rfl:   •  pramipexole (MIRAPEX) 0.5 MG tablet, , Disp: , Rfl:     Allergies:   Allergies   Allergen Reactions   • Demerol [Meperidine]        The following portions of the patient's history were reviewed and updated as appropriate: allergies, current medications, past family history, past medical history, past social history, past surgical history and problem list.        Objective    Objective      Vital Signs:   Vitals:    12/28/21 1348   BP: 122/74   Weight: 73.5 kg (162 lb)   Height: 162.6 cm (64.02\")       Ortho Exam:  RIGHT shoulder  Prior open anterior incision  Pain and weakness with Jobes testing  +Corral's biceps      General: no acute distress, comfortable  Vitals reviewed in chart    Musculoskeletal Exam    SIDE: RIGHT SHOULDER  Shoulder Exam:    Tenderness: rotator cuff and biceps tendon    Range of motion measurements (degrees)  Forward flexion/Abduction/External rotation at side/ER at 90/IR at 90/IR position  Active: very painful 130/130/45/80/70   Passive: intact but painful    Painful arc of motion: yes  No evidence of septic joint  Pain with forward flexion and abduction greater: 90 degrees  Impingement testing Neer's test - positive/painful  Impingement testing Hawkin's test - positive/painful    Rotator Cuff Testing:  Tenderness to palpation at rotator cuff - YES  Rotator cuff testing Penny's test - positive  Rotator cuff testing External rotation - no  Rotator cuff testing Lag signs - no  Rotator cuff testing " Belly press - no  Pain with abduction great than 90 degrees - yes    Scapular dyskinesis - present, abnormal scapular motion    Long head of the biceps testing:  Corral's test for biceps & Speed's test -painful positive  Bicipital groove tenderness to palpation/tenderness to palpation of biceps tendon -painful positive        Results Review:   Imaging Results (Last 24 Hours)     ** No results found for the last 24 hours. **         I personally reviewed and interpreted an MRI within our system from 2019 indicating fairly significant supraspinatus full-thickness retracted tearing fairly medial type II tear, LHB intact    I personally interpreted x-rays of the biopsy system 12/15/2021.  Degenerative changes with calcific tendinitis but no significant superior migration despite the known history of full-thickness chronic rotator cuff tear    Procedures             Assessment / Plan      Assessment/Plan:   Problem List Items Addressed This Visit        Musculoskeletal and Injuries    Nontraumatic complete tear of right rotator cuff - Primary    Biceps tendinitis of right upper extremity    Impingement syndrome of right shoulder    Bursitis of right shoulder    Status post complete repair of rotator cuff          RIGHT SHOULDER  MRI of the shoulder is recommended.  Indication: suspected rotator cuff tear/KNOWN ROTATOR CUFF TEAR since 2019 MRI  The MRI is critical to evaluate for rotator cuff tearing and will help with possible surgical planning.    I think she would be interested in conservative care and possible repeat injection as the last 1 was very effective however I think at this point we need to get an update on the status of these known significant rotator cuff tear since her last MRI was 2 years ago.  Discussed any surgery would be incredibly difficult to get any sort of meaningful healing given a type II failure/type II tear noted on old MRI.      Follow Up: AFTER RIGHT SHOULDER MRI        Jose Ochoa MD,  FAAOS  Orthopedic Surgeon  Fellowship Trained Shoulder and Elbow Surgeon  [facility]  Orthopedics and Sports Medicine  [address], [address]  [place] [address]    12/28/21  14:28 EST

## 2022-01-06 ENCOUNTER — TELEPHONE (OUTPATIENT)
Dept: ORTHOPEDIC SURGERY | Facility: CLINIC | Age: 72
End: 2022-01-06

## 2022-01-06 DIAGNOSIS — M75.21 BICEPS TENDINITIS OF RIGHT UPPER EXTREMITY: ICD-10-CM

## 2022-01-06 DIAGNOSIS — M75.121 NONTRAUMATIC COMPLETE TEAR OF RIGHT ROTATOR CUFF: ICD-10-CM

## 2022-01-06 DIAGNOSIS — M75.41 IMPINGEMENT SYNDROME OF RIGHT SHOULDER: Primary | ICD-10-CM

## 2022-01-06 NOTE — TELEPHONE ENCOUNTER
Caller: Ernestina Barnhart    Relationship: Self    Best call back number: [phone]  What orders are you requesting (i.e. lab or imaging): MRI FOR RIGHT SHOULDER    In what timeframe would the patient need to come in: ASAP    Where will you receive your lab/imaging services: [facility]    Additional notes: PATIENT ADVISED SHE NEEDS DR. FELDER TO SEND MRI ORDER FOR HER RIGHT SHOULDER TO [facility].

## 2022-01-06 NOTE — TELEPHONE ENCOUNTER
MRI order placed and I spoke with the patients  and told him.  I told him when they call to schedule, she can let them know that she wants to go to [place].    Mary Escoto

## 2022-01-10 ENCOUNTER — TELEPHONE (OUTPATIENT)
Dept: ORTHOPEDIC SURGERY | Facility: CLINIC | Age: 72
End: 2022-01-10

## 2022-01-10 NOTE — TELEPHONE ENCOUNTER
Caller: AKOSUA YOUSSEF    Relationship to patient: SELF    Best call back number: [phone]    Patient is needing: PATIENT WAS CALLING TO RECEIVE AN UPDATE ON THE MRI ON HER RIGHT KNEE.

## 2022-01-10 NOTE — TELEPHONE ENCOUNTER
Patient denies calling about an MRI on her right knee.  I verified with her that she is aware of the Right Shoulder MRI on the 18th at 1700.

## 2022-01-18 ENCOUNTER — HOSPITAL ENCOUNTER (OUTPATIENT)
Dept: MRI IMAGING | Facility: HOSPITAL | Age: 72
Discharge: HOME | End: 2022-01-18
Admitting: PHYSICIAN ASSISTANT

## 2022-01-18 DIAGNOSIS — M75.41 IMPINGEMENT SYNDROME OF RIGHT SHOULDER: ICD-10-CM

## 2022-01-18 DIAGNOSIS — M75.121 NONTRAUMATIC COMPLETE TEAR OF RIGHT ROTATOR CUFF: ICD-10-CM

## 2022-01-18 DIAGNOSIS — M75.21 BICEPS TENDINITIS OF RIGHT UPPER EXTREMITY: ICD-10-CM

## 2022-01-18 PROCEDURE — 73221 MRI JOINT UPR EXTREM W/O DYE: CPT

## 2022-01-25 ENCOUNTER — OFFICE VISIT (OUTPATIENT)
Dept: ORTHOPEDIC SURGERY | Facility: CLINIC | Age: 72
End: 2022-01-25

## 2022-01-25 VITALS
BODY MASS INDEX: 27.66 KG/M2 | SYSTOLIC BLOOD PRESSURE: 136 MMHG | HEIGHT: 64 IN | DIASTOLIC BLOOD PRESSURE: 84 MMHG | WEIGHT: 162.04 LBS

## 2022-01-25 DIAGNOSIS — M75.101 ROTATOR CUFF SYNDROME, RIGHT: ICD-10-CM

## 2022-01-25 DIAGNOSIS — M75.21 BICEPS TENDINITIS OF RIGHT UPPER EXTREMITY: ICD-10-CM

## 2022-01-25 DIAGNOSIS — Z98.890 STATUS POST COMPLETE REPAIR OF ROTATOR CUFF: ICD-10-CM

## 2022-01-25 DIAGNOSIS — M75.41 IMPINGEMENT SYNDROME OF RIGHT SHOULDER: Primary | ICD-10-CM

## 2022-01-25 DIAGNOSIS — M75.51 BURSITIS OF RIGHT SHOULDER: ICD-10-CM

## 2022-01-25 DIAGNOSIS — M75.121 NONTRAUMATIC COMPLETE TEAR OF RIGHT ROTATOR CUFF: ICD-10-CM

## 2022-01-25 PROCEDURE — 20610 DRAIN/INJ JOINT/BURSA W/O US: CPT | Performed by: ORTHOPAEDIC SURGERY

## 2022-01-25 PROCEDURE — 99214 OFFICE O/P EST MOD 30 MIN: CPT | Performed by: ORTHOPAEDIC SURGERY

## 2022-01-25 RX ORDER — LIDOCAINE HYDROCHLORIDE 10 MG/ML
5 INJECTION, SOLUTION EPIDURAL; INFILTRATION; INTRACAUDAL; PERINEURAL
Status: COMPLETED | OUTPATIENT
Start: 2022-01-25 | End: 2022-01-25

## 2022-01-25 RX ORDER — DULOXETIN HYDROCHLORIDE 60 MG/1
60 CAPSULE, DELAYED RELEASE ORAL DAILY
COMMUNITY

## 2022-01-25 RX ORDER — ONDANSETRON HYDROCHLORIDE 8 MG/1
TABLET, FILM COATED ORAL
COMMUNITY
Start: 2022-01-12

## 2022-01-25 RX ORDER — TRIAMCINOLONE ACETONIDE 40 MG/ML
40 INJECTION, SUSPENSION INTRA-ARTICULAR; INTRAMUSCULAR
Status: COMPLETED | OUTPATIENT
Start: 2022-01-25 | End: 2022-01-25

## 2022-01-25 RX ADMIN — TRIAMCINOLONE ACETONIDE 40 MG: 40 INJECTION, SUSPENSION INTRA-ARTICULAR; INTRAMUSCULAR at 13:33

## 2022-01-25 RX ADMIN — LIDOCAINE HYDROCHLORIDE 5 ML: 10 INJECTION, SOLUTION EPIDURAL; INFILTRATION; INTRACAUDAL; PERINEURAL at 13:33

## 2022-01-25 NOTE — PROGRESS NOTES
[facility] [place] [facility] Follow Up       Office Follow Up Visit       Patient Name: Ernestina Barnhart    Chief Complaint:   Chief Complaint   Patient presents with   • Follow-up     MRI (1/18/22) follow up; Nontraumatic complete tear of right rotator cuff        Referring Physician: No ref. provider found    History of Present Illness:   It has been 1  month(s) since Ernestina Barnhart's last visit. Ernestina Barnhart returns to clinic today for F/U: follow-up of rightBody Part: shoulderReason: pain. The issue has been ongoing for 25 year(s). Ernestina Barnhart rates HIS/HER: herpain at 7/10 on the pain scale. Previous/current treatments: NSAIDS and physical therapy. Current symptoms:Symptoms: pain, swelling and popping. The pain is worse with standing, sitting, climbing stairs and sleeping; resting, ice, heat, pain medication and/or NSAID and lying down improves the pain. Overall, he/she: sheis doing better.  I have reviewed the patient's history of present illness as noted/entered above.    I have reviewed the patient's past medical history, surgical history, social history, family history, medications, and allergies as noted in the electronic medical record and as noted/entered.  I have reviewed the patient's review of systems as noted/enter and updated as noted in the patient's HPI.    Right shoulder chronic pain and dating up to 20 years.  Surgery with Dr. 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 Arroyo in 1997  Mechanism of Injury at Work: reaching up and injured  Current Work Status: retired  Treatments: w/c surgery      1/25/2022:  Right shoulder  I performed a peer to peer in the interim to get approval from Worker's Compensation for an MRI of the right shoulder which is now been completed.    Counseled and reviewed MRI counseled on operative versus nontreatment options    Prior:  I personally reviewed and interpreted an MRI within our system from 2019 indicating fairly significant supraspinatus full-thickness retracted tearing fairly medial type II tear, LHB intact     I personally interpreted x-rays of the biopsy system 12/15/2021.  Degenerative changes with calcific tendinitis but no significant superior migration despite the known history of full-thickness chronic rotator cuff tear    Subjective   Subjective      Review of Systems   Constitutional: Negative.  Negative for chills, fatigue and fever.   HENT: Negative.  Negative for congestion and dental problem.    Eyes: Negative.  Negative for blurred vision.   Respiratory: Negative.  Negative for shortness of breath.    Cardiovascular: Negative.  Negative for leg swelling.   Gastrointestinal: Negative.  Negative for abdominal pain.   Endocrine: Negative.  Negative for polyuria.   Genitourinary: Negative.  Negative for difficulty urinating.   Musculoskeletal: Positive for arthralgias.   Skin: Negative.    Allergic/Immunologic: Negative.    Neurological: Negative.    Hematological: Negative.  Negative for adenopathy.   Psychiatric/Behavioral: Negative.  Negative for behavioral problems.        Past Medical History:   Past Medical History:   Diagnosis Date   • Arthritis    • Reflux esophagitis        Past Surgical History:   Past Surgical History:   Procedure Laterality Date   • CHOLECYSTECTOMY     • HYSTEROSCOPY  1984    [facility]   • ROTATOR  "CUFF REPAIR      right   • SKIN CANCER EXCISION Right     shoulder       Family History: History reviewed. No pertinent family history.    Social History:   Social History     Socioeconomic History   • Marital status:    Tobacco Use   • Smoking status: Never Smoker   • Smokeless tobacco: Never Used   Substance and Sexual Activity   • Alcohol use: No   • Drug use: No   • Sexual activity: Yes       Medications:   Current Outpatient Medications:   •  cetirizine (zyrTEC) 10 MG tablet, Take 10 mg by mouth Daily., Disp: , Rfl:   •  diclofenac (CATAFLAM) 50 MG tablet, , Disp: , Rfl:   •  Diclofenac Sodium (VOLTAREN) 1 % gel gel, , Disp: , Rfl:   •  DULoxetine (CYMBALTA) 60 MG capsule, Take 60 mg by mouth Daily., Disp: , Rfl:   •  fluticasone (FLONASE) 50 MCG/ACT nasal spray, 1 spray into the nostril(s) as directed by provider 2 (Two) Times a Day., Disp: , Rfl:   •  ipratropium (ATROVENT) 0.03 % nasal spray, ipratropium bromide 0.03 % nasal spray  Spray 2 sprays every day by intranasal route., Disp: , Rfl:   •  meclizine (ANTIVERT) 25 MG tablet, Take 1 tablet by mouth 3 (Three) Times a Day., Disp: , Rfl:   •  pantoprazole (PROTONIX) 40 MG EC tablet, pantoprazole 40 mg tablet,delayed release, Disp: , Rfl:   •  pramipexole (MIRAPEX) 0.5 MG tablet, , Disp: , Rfl:   •  ondansetron (ZOFRAN) 8 MG tablet, , Disp: , Rfl:     Allergies:   Allergies   Allergen Reactions   • Demerol [Meperidine]        The following portions of the patient's history were reviewed and updated as appropriate: allergies, current medications, past family history, past medical history, past social history, past surgical history and problem list.        Objective    Objective      Vital Signs:   Vitals:    01/25/22 1255   BP: 136/84   Weight: 73.5 kg (162 lb 0.6 oz)   Height: 162.6 cm (64.02\")       Ortho Exam:  Right shoulder remains quite stoic does have some pain and weakness with Jobes testing as anticipated but still has fairly well-preserved " active and passive range of motion.  Positive impingement testing with Neer and Vanegas    Results Review:  Imaging Results (Last 24 Hours)     ** No results found for the last 24 hours. **          MRI Shoulder Right Without Contrast    Result Date: 1/19/2022   1. Full-thickness tear of the anterior fibers of the supraspinatus tendon. 2. Tendinosis of the infraspinatus tendon with an intrasubstance tear. 3. Acromioclavicular joint space arthrosis.  This report was finalized on 1/19/2022 8:17 AM by Vaishnavi Carpenter M.D..      I personally reviewed and interpreted MRI above chronic full-thickness tearing of the supraspinatus with retraction.  In some ways does not appear quite as dramatic as the prior outside image.  Some partial tearing as well the infraspinatus AC joint arthritis subscapularis appears intact chronic changes long head of the biceps    Procedures    Right SHOULDER SUBACROMIAL SPACE INJECTION: Risks and benefits of a shoulder subacromial space injection were discussed and the patient desired to proceed. Verbal consent was obtained. The patient understood the risk of infection, potential skin changes, bump in blood glucose especially with diabetes, nerve injury, possibility of increased pain in the short term, and possible incomplete pain relief.  Using sterile technique, the shoulder subacromial space was injected from a posterior approach with 1mL of 40 mg triamcinolone acetonide 40 MG/ML and 4cc of lidocaine with aspiration prior to injection. The patient tolerated the procedure without difficulty.  CPT CODE 11006 for major joint aspiration/injection      Counseled the patient that if additional surgery was anticipated typically avoid injection in the setting of a known rotator cuff tear given risks of infection, nonhealing.  She does not desire additional surgery and has done well with prior subacromial injections        Assessment / Plan      Assessment/Plan:   Problem List Items Addressed This  Visit        Musculoskeletal and Injuries    Nontraumatic complete tear of right rotator cuff    Biceps tendinitis of right upper extremity    Impingement syndrome of right shoulder - Primary    Bursitis of right shoulder    Status post complete repair of rotator cuff      Other Visit Diagnoses     Rotator cuff syndrome, right              Worker's Compensation injury, chronic patient not interested in surgery.  Counseled on operative versus nonoperative measures.  She would like to proceed with a steroid injection understanding the risks and benefits as highlighted above.  She will continue with a home exercise program.  She understands that surgery still may be indicated in the future.  Counseled on the complexity of chronic rotator cuff tearing, recurrent tearing, surgery in the setting of revision.    She would like to avoid any additional operation on the shoulder.    Follow Up: 6 weeks, anticipate final checkup in this Worker's Compensation chronic setting.      Jose Ochoa MD, FAAOS  Orthopedic Surgeon  Fellowship Trained Shoulder and Elbow Surgeon  [facility]  Orthopedics and Sports Medicine  [address] [place] [address], [address]  [place] [address]    01/25/22  13:55 EST

## 2022-01-25 NOTE — PROGRESS NOTES
Procedure   Large Joint Arthrocentesis: R subacromial bursa  Date/Time: 1/25/2022 1:33 PM  Consent given by: patient  Site marked: site marked  Timeout: Immediately prior to procedure a time out was called to verify the correct patient, procedure, equipment, support staff and site/side marked as required   Supporting Documentation  Indications: pain   Procedure Details  Location: shoulder - R subacromial bursa  Preparation: Patient was prepped and draped in the usual sterile fashion  Needle size: 22 G  Approach: posterior  Medications administered: 5 mL lidocaine PF 1% 1 %; 40 mg triamcinolone acetonide 40 MG/ML  Patient tolerance: patient tolerated the procedure well with no immediate complications

## 2022-02-14 ENCOUNTER — TELEPHONE (OUTPATIENT)
Dept: ORTHOPEDIC SURGERY | Facility: CLINIC | Age: 72
End: 2022-02-14

## 2022-02-14 NOTE — TELEPHONE ENCOUNTER
Caller: AKOSUA YOUSSEF    Relationship to patient: SELF    Best call back number: [phone]    Chief complaint: RIGHT KNEE    Type of visit: FOLLOW UP    Requested date: 2/23/22    If rescheduling, when is the original appointment: 2/28/22    Additional notes: PATIENTS RIDE ALREADY HAS AN APPOINTMENT THIS DAY AND WANTS TO TRY TO BE SEEN THE SAME DAY. PLEASE ADVISE

## 2022-02-15 ENCOUNTER — TELEPHONE (OUTPATIENT)
Dept: ORTHOPEDIC SURGERY | Facility: CLINIC | Age: 72
End: 2022-02-15

## 2022-02-15 NOTE — TELEPHONE ENCOUNTER
Called and spoke with Pt to inform her that Sara does not have any appt's available for 2/23/22.    Pt verbalized understanding and will keep scheduled appt on 2/28/22

## 2022-02-15 NOTE — TELEPHONE ENCOUNTER
Caller: AKOSUA YOUSSEF    Relationship:  SELF     Best call back number: [phone]    What was the call regarding: PATIENT CALLED TO RESCHEDULE 02/28 APPT TO 02/23 IF POSSIBLE- HUB UNABLE TO RESCHEDULE DUE TO PATIENT REQUESTING INJECTIONS- PLEASE RETURN CALL     Do you require a callback: YES

## 2022-02-17 ENCOUNTER — TELEPHONE (OUTPATIENT)
Dept: ORTHOPEDIC SURGERY | Facility: CLINIC | Age: 72
End: 2022-02-17

## 2022-02-17 NOTE — TELEPHONE ENCOUNTER
Provider: DR IGNACIO GEE  Caller: AKOSUA YOUSSEF  Relationship to Patient: SELF  Phone Number: [phone]  Reason for Call: WOULD LIKE TO SCHEDULE ANOTHER INJ FOR R KNEE, PLEASE CALL PATIENT TO SCHEDULE

## 2022-02-28 ENCOUNTER — OFFICE VISIT (OUTPATIENT)
Dept: ORTHOPEDIC SURGERY | Facility: CLINIC | Age: 72
End: 2022-02-28

## 2022-02-28 VITALS
SYSTOLIC BLOOD PRESSURE: 128 MMHG | HEIGHT: 64 IN | BODY MASS INDEX: 27.96 KG/M2 | DIASTOLIC BLOOD PRESSURE: 78 MMHG | WEIGHT: 163.8 LBS

## 2022-02-28 DIAGNOSIS — G89.29 CHRONIC PAIN OF RIGHT KNEE: Primary | ICD-10-CM

## 2022-02-28 DIAGNOSIS — M17.11 OSTEOARTHRITIS OF RIGHT KNEE, UNSPECIFIED OSTEOARTHRITIS TYPE: ICD-10-CM

## 2022-02-28 DIAGNOSIS — M25.561 CHRONIC PAIN OF RIGHT KNEE: Primary | ICD-10-CM

## 2022-02-28 PROCEDURE — 20610 DRAIN/INJ JOINT/BURSA W/O US: CPT | Performed by: PHYSICIAN ASSISTANT

## 2022-02-28 PROCEDURE — 99214 OFFICE O/P EST MOD 30 MIN: CPT | Performed by: PHYSICIAN ASSISTANT

## 2022-02-28 RX ORDER — ESTRADIOL 1 MG/1
1 TABLET ORAL DAILY
COMMUNITY

## 2022-02-28 RX ADMIN — TRIAMCINOLONE ACETONIDE 40 MG: 40 INJECTION, SUSPENSION INTRA-ARTICULAR; INTRAMUSCULAR at 11:50

## 2022-02-28 RX ADMIN — LIDOCAINE HYDROCHLORIDE 4 ML: 10 INJECTION, SOLUTION EPIDURAL; INFILTRATION; INTRACAUDAL; PERINEURAL at 11:50

## 2022-03-03 RX ORDER — TRIAMCINOLONE ACETONIDE 40 MG/ML
40 INJECTION, SUSPENSION INTRA-ARTICULAR; INTRAMUSCULAR
Status: COMPLETED | OUTPATIENT
Start: 2022-02-28 | End: 2022-02-28

## 2022-03-03 RX ORDER — LIDOCAINE HYDROCHLORIDE 10 MG/ML
4 INJECTION, SOLUTION EPIDURAL; INFILTRATION; INTRACAUDAL; PERINEURAL
Status: COMPLETED | OUTPATIENT
Start: 2022-02-28 | End: 2022-02-28

## 2022-03-22 ENCOUNTER — OFFICE VISIT (OUTPATIENT)
Dept: ORTHOPEDIC SURGERY | Facility: CLINIC | Age: 72
End: 2022-03-22

## 2022-03-22 VITALS
WEIGHT: 163.8 LBS | SYSTOLIC BLOOD PRESSURE: 140 MMHG | BODY MASS INDEX: 27.96 KG/M2 | DIASTOLIC BLOOD PRESSURE: 78 MMHG | HEIGHT: 64 IN

## 2022-03-22 DIAGNOSIS — M75.21 BICEPS TENDINITIS OF RIGHT UPPER EXTREMITY: ICD-10-CM

## 2022-03-22 DIAGNOSIS — M17.11 OSTEOARTHRITIS OF RIGHT KNEE, UNSPECIFIED OSTEOARTHRITIS TYPE: Primary | ICD-10-CM

## 2022-03-22 DIAGNOSIS — Z98.890 STATUS POST COMPLETE REPAIR OF ROTATOR CUFF: ICD-10-CM

## 2022-03-22 DIAGNOSIS — M75.51 BURSITIS OF RIGHT SHOULDER: ICD-10-CM

## 2022-03-22 DIAGNOSIS — M75.121 NONTRAUMATIC COMPLETE TEAR OF RIGHT ROTATOR CUFF: ICD-10-CM

## 2022-03-22 DIAGNOSIS — M25.561 CHRONIC PAIN OF RIGHT KNEE: ICD-10-CM

## 2022-03-22 DIAGNOSIS — M75.41 IMPINGEMENT SYNDROME OF RIGHT SHOULDER: ICD-10-CM

## 2022-03-22 DIAGNOSIS — G89.29 CHRONIC PAIN OF RIGHT KNEE: ICD-10-CM

## 2022-03-22 PROCEDURE — 99212 OFFICE O/P EST SF 10 MIN: CPT | Performed by: ORTHOPAEDIC SURGERY

## 2022-03-22 NOTE — PROGRESS NOTES
[facility] [place] Orthopaedic Surgery Office Follow Up       Office Follow Up Visit       Patient Name: Ernestina Barnhart    Chief Complaint:   Chief Complaint   Patient presents with   • Follow-up     8 weeks- Nontraumatic complete tear of right rotator cuff       Referring Physician: No ref. provider found    History of Present Illness:   It has been 8  week(s) since Ernestina Barnhart's last visit. Ernestina Barnhart returns to clinic today for F/U: follow-up of rightBody Part: shoulderReason: pain. The issue has been ongoing for 25 year(s). Ernestina Barnhart rates HIS/HER: herpain at 4/10 on the pain scale. Previous/current treatments: NSAIDS and physical therapy. Current symptoms:Symptoms: pain and stiffness. The pain is worse with sleeping and lying on affected side; resting, ice and pain medication and/or NSAID improves the pain. Overall, he/she: sheis doing better.  I have reviewed the patient's history of present illness as noted/entered above.    I have reviewed the patient's past medical history, surgical history, social history, family history, medications, and allergies as noted in the electronic medical record and as noted/entered.  I have reviewed the patient's review of systems as noted/enter and updated as noted in the patient's HPI.    Right shoulder chronic pain and dating up to 20 years.  Surgery with Dr. 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 Arroyo in 1997  Mechanism of Injury at Work: reaching up and injured  Current Work Status: retired  Treatments: w/c surgery    3/22/2022:   Chronic issues, but better since last visit  Injection 1/25/2022 - helped  Incredibly stoic        1/25/2022:  Right shoulder  I performed a peer to peer in the interim to get approval from Worker's Compensation for an MRI of the right shoulder which is now been completed.     Counseled and reviewed MRI counseled on operative versus nontreatment options     Prior:  I personally reviewed and interpreted an MRI within our system from 2019 indicating fairly significant supraspinatus full-thickness retracted tearing fairly medial type II tear, LHB intact     I personally interpreted x-rays of the biopsy system 12/15/2021.  Degenerative changes with calcific tendinitis but no significant superior migration despite the known history of full-thickness chronic rotator cuff tear      Subjective   Subjective      Review of Systems     Past Medical History:   Past Medical History:   Diagnosis Date   • Arthritis    • Reflux esophagitis        Past Surgical History:   Past Surgical History:   Procedure Laterality Date   • CHOLECYSTECTOMY     • HYSTEROSCOPY  1984    [facility]   • ROTATOR CUFF REPAIR      right   • SKIN CANCER EXCISION Right     shoulder       Family History: History reviewed. No pertinent family history.    Social History:   Social History     Socioeconomic History   • Marital status:    Tobacco Use   • Smoking status: Never Smoker   • Smokeless tobacco: Never Used   Substance and Sexual Activity   • Alcohol use: No   • Drug use: No   • Sexual activity: Yes       Medications:   Current Outpatient Medications:   •  cetirizine (zyrTEC) 10 MG tablet, Take 10 mg by mouth Daily., Disp: , Rfl:   •  diclofenac (CATAFLAM) 50 MG tablet, , Disp: , Rfl:   •  DULoxetine (CYMBALTA) 60 MG capsule, Take 60 mg by  "mouth Daily., Disp: , Rfl:   •  estradiol (ESTRACE) 1 MG tablet, Take 1 mg by mouth Daily., Disp: , Rfl:   •  fluticasone (FLONASE) 50 MCG/ACT nasal spray, 1 spray into the nostril(s) as directed by provider 2 (Two) Times a Day., Disp: , Rfl:   •  ipratropium (ATROVENT) 0.03 % nasal spray, ipratropium bromide 0.03 % nasal spray  Spray 2 sprays every day by intranasal route., Disp: , Rfl:   •  meclizine (ANTIVERT) 25 MG tablet, Take 1 tablet by mouth 3 (Three) Times a Day., Disp: , Rfl:   •  ondansetron (ZOFRAN) 8 MG tablet, , Disp: , Rfl:   •  pantoprazole (PROTONIX) 40 MG EC tablet, pantoprazole 40 mg tablet,delayed release, Disp: , Rfl:     Allergies:   Allergies   Allergen Reactions   • Demerol [Meperidine]        The following portions of the patient's history were reviewed and updated as appropriate: allergies, current medications, past family history, past medical history, past social history, past surgical history and problem list.        Objective    Objective      Vital Signs:   Vitals:    03/22/22 1258   BP: 140/78   Weight: 74.3 kg (163 lb 12.8 oz)   Height: 162.6 cm (64.02\")       Ortho Exam:  RIGHT SHOULDER  Incredibly stoic  Excellent compensation  Impingement pain better  Baseline weakness but deltoid compensation    Results Review:  Imaging Results (Last 24 Hours)     ** No results found for the last 24 hours. **          MRI Shoulder Right Without Contrast    Result Date: 1/19/2022   1. Full-thickness tear of the anterior fibers of the supraspinatus tendon. 2. Tendinosis of the infraspinatus tendon with an intrasubstance tear. 3. Acromioclavicular joint space arthrosis.  This report was finalized on 1/19/2022 8:17 AM by Vaishnavi Carpenter M.D..          Procedures          Assessment / Plan      Assessment/Plan:   Problem List Items Addressed This Visit        Musculoskeletal and Injuries    Nontraumatic complete tear of right rotator cuff    Biceps tendinitis of right upper extremity    Impingement " syndrome of right shoulder    Bursitis of right shoulder    Status post complete repair of rotator cuff      Other Visit Diagnoses     Osteoarthritis of right knee, unspecified osteoarthritis type    -  Primary    Chronic pain of right knee            RIGHT SHOULDER  Excellent compensation  Okay to follow-up as needed at this point    Worker's Compensation injury she may eventually need surgical treatment in the future if so would likely be replacement surgery but does not appear anywhere near that at this time given her stoic nature and excellent improvements    Follow Up: As needed      Jose Ochoa MD, FAAOS  Orthopedic Surgeon  Fellowship Trained Shoulder and Elbow Surgeon  [facility]  Orthopedics and Sports Medicine  [address] [place] [address], [address]  [place] [address]    03/22/22  13:36 EDT

## 2022-04-11 ENCOUNTER — OFFICE VISIT (OUTPATIENT)
Dept: ORTHOPEDIC SURGERY | Facility: CLINIC | Age: 72
End: 2022-04-11

## 2022-04-11 VITALS
SYSTOLIC BLOOD PRESSURE: 132 MMHG | BODY MASS INDEX: 27.96 KG/M2 | DIASTOLIC BLOOD PRESSURE: 78 MMHG | WEIGHT: 163.8 LBS | HEIGHT: 64 IN

## 2022-04-11 DIAGNOSIS — M17.11 OSTEOARTHRITIS OF RIGHT KNEE, UNSPECIFIED OSTEOARTHRITIS TYPE: ICD-10-CM

## 2022-04-11 DIAGNOSIS — M25.561 CHRONIC PAIN OF RIGHT KNEE: Primary | ICD-10-CM

## 2022-04-11 DIAGNOSIS — G89.29 CHRONIC PAIN OF RIGHT KNEE: Primary | ICD-10-CM

## 2022-04-11 DIAGNOSIS — M70.50 PES ANSERINE BURSITIS: ICD-10-CM

## 2022-04-11 PROCEDURE — 99213 OFFICE O/P EST LOW 20 MIN: CPT | Performed by: PHYSICIAN ASSISTANT

## 2022-04-11 PROCEDURE — 20610 DRAIN/INJ JOINT/BURSA W/O US: CPT | Performed by: PHYSICIAN ASSISTANT

## 2022-04-11 RX ADMIN — TRIAMCINOLONE ACETONIDE 40 MG: 40 INJECTION, SUSPENSION INTRA-ARTICULAR; INTRAMUSCULAR at 10:11

## 2022-04-11 RX ADMIN — LIDOCAINE HYDROCHLORIDE 2 ML: 10 INJECTION, SOLUTION EPIDURAL; INFILTRATION; INTRACAUDAL; PERINEURAL at 10:11

## 2022-04-11 NOTE — PROGRESS NOTES
Procedure   Large Joint Arthrocentesis: R knee  Date/Time: 4/11/2022 10:11 AM  Consent given by: patient  Site marked: site marked  Timeout: Immediately prior to procedure a time out was called to verify the correct patient, procedure, equipment, support staff and site/side marked as required   Supporting Documentation  Indications: pain   Procedure Details  Location: knee - R knee  Preparation: Patient was prepped and draped in the usual sterile fashion  Needle size: 22 G  Approach: anteromedial  Medications administered: 2 mL lidocaine PF 1% 1 %; 40 mg triamcinolone acetonide 40 MG/ML (pes bursa)  Patient tolerance: patient tolerated the procedure well with no immediate complications

## 2022-04-11 NOTE — PROGRESS NOTES
"    [facility] [place] Orthopaedic Surgery Clinic Note        Subjective     CC: Follow-up (6 week recheck - Chronic pain of right knee - injection given 02/28/22)      HPI    Ernestina Barnhart is a 71 y.o. female.  Patient returns today for follow-up evaluation of her right knee.  I previously seen her on 2/28/2022 and provided a corticosteroid injection.  She states that injection did not help a lot of her pain.  All of her pain is still medially but now she is pointing to the pes bursa region.    Pain scale: 8/10.  Associated symptoms swelling and stiffness.  Pain is worse with sitting, sleeping, lying on the affected side, resting, ice, medication help.  No reported numbness or tingling.    Overall patient feels her symptoms are worsening with regards to increased pain to the medial aspect of the right knee.    ROS:    Constiutional:Pt denies fever, chills, nausea, or vomiting.  MSK:as above        Objective      Past Medical History  Past Medical History:   Diagnosis Date   • Arthritis    • Reflux esophagitis          Physical Exam  /78   Ht 162.6 cm (64.02\")   Wt 74.3 kg (163 lb 12.8 oz)   BMI 28.10 kg/m²     Body mass index is 28.1 kg/m².    Patient is well nourished and well developed.        Ortho Exam  Right knee  Alignment: Genu varum  Skin: Grossly intact without any redness, warmth.  Trace effusion noted.  Tenderness:  Pes greater than medial joint line on exam today.  Motion: 0-120 degrees with crepitus appreciable.  Testing: Varus and valgus stress test, Lachman's all negative.  Motor/sensory: Grossly intact L2-S1.      Imaging/Labs/EMG Reviewed:  No new imaging today.      Assessment:  1. Chronic pain of right knee    2. Pes anserine bursitis    3. Osteoarthritis of right knee, unspecified osteoarthritis type        Plan:  1. Right chronic knee pain due to Pez anserine bursitis and osteoarthritis of the right knee.  Patient recently received corticosteroid injection intra-articularly February 2022 but no " improvement of symptoms.  On today's exam she is more symptomatic along the pes anserine bursa.  2. Offered and accepted a pes anserine bursal injection.  Injection was given today.  3. Continue with over-the-counter pain medication as needed.  4. Follow-up visit in 4 weeks, if she has had no relief from this injection in addition to having had no relief from the corticosteroid injection; then, recommend and proceed with an MRI for further evaluation of cartilage and meniscus.  She does have questionable medial meniscal tear from an MRI in 2020.  5. Questions and concerns answered.    After discussing the risks, benefits, indications of injection, the patient gave consent to proceed.  Her right pes anserine bursa was confirmed as the correct site to be injected with a timeout.  It was then prepped using Hibiclens and injected with a mixture of 2 cc of 1% plain lidocaine and 1 cc of Kenalog (40 mg per mL), without any resistance through the anterior approach, patient in seated position.  Area was cleaned, hemostasis was achieved and a Band-Aid was applied over the injection site.  The patient tolerated procedure well.  I instructed the patient on signs and symptoms of infection.  They should report to the emergency department or return to clinic if any of these develop, for further evaluation and treatment.  Recommended modifying activity for the next 48 hours to include rest, ice, elevation and oral pain medication as needed.        Sara Waldron PA-C  04/13/22  15:21 EDT      Dictated Utilizing Dragon Dictation.

## 2022-04-13 RX ORDER — TRIAMCINOLONE ACETONIDE 40 MG/ML
40 INJECTION, SUSPENSION INTRA-ARTICULAR; INTRAMUSCULAR
Status: COMPLETED | OUTPATIENT
Start: 2022-04-11 | End: 2022-04-11

## 2022-04-13 RX ORDER — LIDOCAINE HYDROCHLORIDE 10 MG/ML
2 INJECTION, SOLUTION EPIDURAL; INFILTRATION; INTRACAUDAL; PERINEURAL
Status: COMPLETED | OUTPATIENT
Start: 2022-04-11 | End: 2022-04-11

## 2022-06-03 ENCOUNTER — OFFICE VISIT (OUTPATIENT)
Dept: ORTHOPEDIC SURGERY | Facility: CLINIC | Age: 72
End: 2022-06-03

## 2022-06-03 VITALS
WEIGHT: 167 LBS | HEIGHT: 64 IN | SYSTOLIC BLOOD PRESSURE: 110 MMHG | DIASTOLIC BLOOD PRESSURE: 70 MMHG | BODY MASS INDEX: 28.51 KG/M2

## 2022-06-03 DIAGNOSIS — G89.29 CHRONIC PAIN OF RIGHT KNEE: Primary | ICD-10-CM

## 2022-06-03 DIAGNOSIS — M70.50 PES ANSERINE BURSITIS: ICD-10-CM

## 2022-06-03 DIAGNOSIS — S83.241D TEAR OF MEDIAL MENISCUS OF RIGHT KNEE, UNSPECIFIED TEAR TYPE, UNSPECIFIED WHETHER OLD OR CURRENT TEAR, SUBSEQUENT ENCOUNTER: ICD-10-CM

## 2022-06-03 DIAGNOSIS — M25.561 CHRONIC PAIN OF RIGHT KNEE: Primary | ICD-10-CM

## 2022-06-03 DIAGNOSIS — M17.11 OSTEOARTHRITIS OF RIGHT KNEE, UNSPECIFIED OSTEOARTHRITIS TYPE: ICD-10-CM

## 2022-06-03 PROCEDURE — 99213 OFFICE O/P EST LOW 20 MIN: CPT | Performed by: PHYSICIAN ASSISTANT

## 2022-06-03 RX ORDER — METHOCARBAMOL 500 MG/1
500 TABLET, FILM COATED ORAL 4 TIMES DAILY
COMMUNITY

## 2022-06-03 NOTE — PROGRESS NOTES
"    [facility] [place] Orthopaedic Surgery Clinic Note        Subjective     CC: Follow-up (8 week follow up - Chronic pain of right knee )      HPI    Ernestina Barnhart is a 71 y.o. female.  Patient returns today continue to complain of chronic medial sided knee pain.  She has been provided with corticosteroid to the knee joint itself as well as the pes.  She states that the steroid intra-articularly helped for about 1 to 2 weeks with the injection to the pes bursa provided no benefit.    Current pain scale: 7/10.  She continues to have pain and stiffness as well as swelling to the knee.  Worse with walking, sleeping and lying on the affected side.  Resting, ice and medication do help but symptoms are not resolving.  No reported numbness or tingling.     Overall, patient's symptoms are unchanged since last visit in April 2022.      ROS:    Constiutional:Pt denies fever, chills, nausea, or vomiting.  MSK:as above        Objective      Past Medical History  Past Medical History:   Diagnosis Date   • Arthritis    • Reflux esophagitis          Physical Exam  /70   Ht 162.6 cm (64.02\")   Wt 75.8 kg (167 lb)   BMI 28.65 kg/m²     Body mass index is 28.65 kg/m².    Patient is well nourished and well developed.        Ortho Exam  Right knee  Alignment: Genu varum  Skin: Grossly intact without any redness or warmth.  Trace effusion noted.  Tenderness: Patient continues to have tenderness medial aspect the knee to include medial joint line and pes bursa.  No tenderness noted laterally.  Motion: 0-120 degrees with crepitus and pain to the medial joint line on motion.  Testing: Varus and valgus stress test negative.  Lachman negative.  Meniscus: Positive pain medial joint line with Hannah's.  Motor/sensory: Grossly intact L2-S1.      Imaging/Labs/EMG Reviewed:  Ordered right knee plain films.  Imaging read/interpreted by Dr. Cardona.    Knee X-Ray  Indication: Pain     Upright AP of bilateral knees. Lateral, skiers and [place] " views of right knee     Findings: Medial joint space narrowing with small osteophytes  Chondrocalcinosis present  No fracture  No prior studies were available for comparison.    Prior MRI from March 2020 showed signal change within the posterior horn of the medial meniscus consistent with possible incomplete tear as well as bone marrow edema noted to the medial tibial plateau.      Assessment:  1. Chronic pain of right knee    2. Osteoarthritis of right knee, unspecified osteoarthritis type    3. Tear of medial meniscus of right knee, unspecified tear type, unspecified whether old or current tear, subsequent encounter    4. Pes anserine bursitis        Plan:  1. Right chronic knee pain due to osteoarthritis of the right knee--patient has had both intra-articular as well as pes corticosteroid injection.  No injections have helped with regards to her pain symptoms.  Prior MRI did show possible incomplete tear of the medial meniscus.  2. Pes anserine bursitis--continue with observation for now.  3. Due to failure of conservative management to include NSAIDs, injections, etc.--ordered repeat MRI of the right knee.  She would like it done in [place].  4. Continue with over-the-counter pain medication as needed.  5. Patient states she recently had a complete blood blood work-up for arthritis and was told it was negative--I asked the patient to bring those results in for review.  6. Follow-up after the MRI completed for review and discussion of further treatment options.  This appointment needs to be on a Monday afternoon with Dr. Ordoñez is also in clinic.  7. Questions and concerns answered.      Sara Waldron PA-C  06/07/22  21:06 EDT      Dictated Utilizing Dragon Dictation.

## 2022-06-30 ENCOUNTER — TELEPHONE (OUTPATIENT)
Dept: ORTHOPEDIC SURGERY | Facility: CLINIC | Age: 72
End: 2022-06-30

## 2022-07-01 NOTE — TELEPHONE ENCOUNTER
Spoke with Pt and she provided me with several fax #'s...Sent demographics and MRI referral to [phone] and [phone]......     Called Central Scheduling @[facility] and spoke with Ms Mcgovern and was able to get appointment scheduled for July 14 with the arrival time of 1:00pm.....  Attempted to contact Pt at phone # on file with no success....Was able to contact a friend from the phone # that was provide to me previously by the Pt([phone]) and informed her of the appt time and date for her MRI....Advised her to have Pt call office with any questions....

## 2022-07-18 ENCOUNTER — OFFICE VISIT (OUTPATIENT)
Dept: ORTHOPEDIC SURGERY | Facility: CLINIC | Age: 72
End: 2022-07-18

## 2022-07-18 VITALS
HEIGHT: 64 IN | WEIGHT: 167.11 LBS | SYSTOLIC BLOOD PRESSURE: 122 MMHG | DIASTOLIC BLOOD PRESSURE: 86 MMHG | BODY MASS INDEX: 28.53 KG/M2

## 2022-07-18 DIAGNOSIS — M17.11 OSTEOARTHRITIS OF RIGHT KNEE, UNSPECIFIED OSTEOARTHRITIS TYPE: ICD-10-CM

## 2022-07-18 DIAGNOSIS — G89.29 CHRONIC PAIN OF RIGHT KNEE: Primary | ICD-10-CM

## 2022-07-18 DIAGNOSIS — M25.561 CHRONIC PAIN OF RIGHT KNEE: Primary | ICD-10-CM

## 2022-07-18 DIAGNOSIS — S83.241D TEAR OF MEDIAL MENISCUS OF RIGHT KNEE, UNSPECIFIED TEAR TYPE, UNSPECIFIED WHETHER OLD OR CURRENT TEAR, SUBSEQUENT ENCOUNTER: ICD-10-CM

## 2022-07-18 PROCEDURE — 99214 OFFICE O/P EST MOD 30 MIN: CPT | Performed by: PHYSICIAN ASSISTANT

## 2022-07-18 RX ORDER — ATORVASTATIN CALCIUM 40 MG/1
TABLET, FILM COATED ORAL
COMMUNITY
Start: 2022-06-29

## 2022-07-18 NOTE — PROGRESS NOTES
"    [facility] [place] Orthopaedic Surgery Clinic Note        Subjective     CC: Follow-up of the Right Knee (After MRI 07/14/2022)      HPI    Ernestina Barnhart is a 71 y.o. female.  Patient returns today for MRI follow-up of her right knee.  She has had no change in pain level (7/10) nor change in symptoms.  She continues to have pain to the knee especially along the medial joint line.  No reported numbness or tingling.  Associated symptoms include swelling and stiffness.  Pain is worse with sitting, sleeping and lying on the affected side.  Resting, ice and medication help ease the pain but it never resolves it.  She has had both viscosupplementation series as well as corticosteroids without any resolution of symptoms.    Overall, patient's symptoms are unchanged.    ROS:    Constiutional:Pt denies fever, chills, nausea, or vomiting.  MSK:as above        Objective      Past Medical History  Past Medical History:   Diagnosis Date   • Arthritis    • Reflux esophagitis          Physical Exam  /86   Ht 162.6 cm (64.02\")   Wt 75.8 kg (167 lb 1.7 oz)   BMI 28.67 kg/m²     Body mass index is 28.67 kg/m².    Patient is well nourished and well developed.        Ortho Exam  Right knee  Alignment: Genu varum  Skin: Grossly intact without any redness or warmth.    Mild effusion noted.  Tenderness: Positive medial aspect the knee to include medial joint line.  No tenderness noted laterally.  Motion: 0-120 degrees with crepitus and pain to the medial joint line on motion.  Testing: Varus and valgus stress test negative.  Lachman negative.  Meniscus: Positive pain medial joint line with Hannah's.  Motor/sensory: Grossly intact L2-S1.      Imaging/Labs/EMG Reviewed:  Dr. Ordoñez and I reviewed MRI performed at [facility] on 7/14/2022.    Per report abnormal signal noted posterior horn medial meniscus consistent with chronic degenerative full-thickness tear.  Moderate joint effusion.  Narrowing of the lateral articular facet of " the patellofemoral joint consistent with osteoarthritis.      Assessment:  1. Chronic pain of right knee    2. Osteoarthritis of right knee, unspecified osteoarthritis type    3. Tear of medial meniscus of right knee, unspecified tear type, unspecified whether old or current tear, subsequent encounter        Plan:  1. Right chronic knee pain due to osteoarthritis and degenerative medial meniscal tear.    2. Treatment options discussed with the patient to include observation, repeat injections (although previous injections did not help), surgical intervention (arthroscopy).  3. Patient states she just wants the pain gone but she needs to discuss with family regarding surgical intervention, which would include right knee arthroscopy with medial meniscectomy.  4. Unfortunately cannot give patient guarantee as to whether this would resolve all of her pain as she does have underlying arthritis.  Patient verbalized understanding and wishes to think about her options.  5. We discussed risk, benefits and indications of surgery along with postoperative recovery and rehab.  If she wishes to proceed with surgical invention she can contact the clinic for a surgical date with Dr. Ordoñez.  6. For now we will continue with over-the-counter pain medications as needed.  7. Follow-up as needed.  8. Questions and concerns answered.      Sara Waldron PA-C  07/21/22  11:24 EDT      Dictated Utilizing Dragon Dictation.

## 2022-08-09 ENCOUNTER — OFFICE VISIT (OUTPATIENT)
Dept: ORTHOPEDIC SURGERY | Facility: CLINIC | Age: 72
End: 2022-08-09

## 2022-08-09 VITALS
DIASTOLIC BLOOD PRESSURE: 82 MMHG | BODY MASS INDEX: 28.53 KG/M2 | SYSTOLIC BLOOD PRESSURE: 118 MMHG | WEIGHT: 167.11 LBS | HEIGHT: 64 IN

## 2022-08-09 DIAGNOSIS — M75.52 BURSITIS OF LEFT SHOULDER: ICD-10-CM

## 2022-08-09 DIAGNOSIS — M25.512 LEFT SHOULDER PAIN, UNSPECIFIED CHRONICITY: ICD-10-CM

## 2022-08-09 DIAGNOSIS — S46.002A ROTATOR CUFF INJURY, LEFT, INITIAL ENCOUNTER: Primary | ICD-10-CM

## 2022-08-09 DIAGNOSIS — M75.22 BICEPS TENDINITIS OF LEFT UPPER EXTREMITY: ICD-10-CM

## 2022-08-09 DIAGNOSIS — M75.42 IMPINGEMENT SYNDROME OF LEFT SHOULDER: ICD-10-CM

## 2022-08-09 PROCEDURE — 99214 OFFICE O/P EST MOD 30 MIN: CPT | Performed by: ORTHOPAEDIC SURGERY

## 2022-08-09 RX ORDER — ASPIRIN 81 MG/1
81 TABLET ORAL DAILY
COMMUNITY

## 2022-08-09 NOTE — PROGRESS NOTES
[facility] Visit - Jose Ochoa MD    Office Visit       Patient Name: Ernestina Barnhart    Chief Complaint:   Chief Complaint   Patient presents with   • Left Shoulder - Pain       Referring Physician: No ref. provider found    History of Present Illness:   Ernestina Barnhart is a 71 y.o. female who presents with left body part: shoulder Reason: pain.  Onset:Onset: atraumatic and gradual in nature. The issue has been ongoing for 3 month(s). Pain is a 8/10 on the pain scale. Pain is described as Pain Characterization: stabbing. Associated symptoms include Symptoms: pain. The pain is worse with sleeping and lying on affected side; resting, ice and pain medication and/or NSAID improve the pain. Previous treatments have included: NSAIDS. I have reviewed the patient's history of present illness as noted/entered above.    I have reviewed the patient's past medical history, surgical history, social history, family history, medications, and allergies as noted in the electronic medical record and as noted/entered.  I have reviewed the patient's review of systems as noted/enter and updated as noted in the patient's HPI.      Right shoulder chronic pain and dating up to 20 years.  Surgery with Dr. 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 Arroyo in 1997  Mechanism of  Injury at Work: reaching up and injured  Current Work Status: retired  Treatments: w/c surgery     3/22/2022:   Chronic issues, but better since last visit  Injection 1/25/2022 - helped  Incredibly stoic        1/25/2022:  Right shoulder  I performed a peer to peer in the interim to get approval from Worker's Compensation for an MRI of the right shoulder which is now been completed.     Counseled and reviewed MRI counseled on operative versus nontreatment options     Prior:  I personally reviewed and interpreted an MRI within our system from 2019 indicating fairly significant supraspinatus full-thickness retracted tearing fairly medial type II tear, LHB intact     I personally interpreted x-rays of the biopsy system 12/15/2021.  Degenerative changes with calcific tendinitis but no significant superior migration despite the known history of full-thickness chronic rotator cuff tear      8/9/2022:  New diagnosis of left shoulder pain pain worsening over the last 3 months rating the pain is 8 out of 10.  Retired, no specific injury to the left shoulder.      Subjective   Subjective      Review of Systems   Constitutional: Negative.  Negative for chills, fatigue and fever.   HENT: Negative.  Negative for congestion and dental problem.    Eyes: Negative.  Negative for blurred vision.   Respiratory: Negative.  Negative for shortness of breath.    Cardiovascular: Negative.  Negative for leg swelling.   Gastrointestinal: Negative.  Negative for abdominal pain.   Endocrine: Negative.  Negative for polyuria.   Genitourinary: Negative.  Negative for difficulty urinating.   Musculoskeletal: Positive for arthralgias.   Skin: Negative.    Allergic/Immunologic: Negative.    Neurological: Negative.    Hematological: Negative.  Negative for adenopathy.   Psychiatric/Behavioral: Negative.  Negative for behavioral problems.        Past Medical History:   Past Medical History:   Diagnosis Date   • Arthritis    • Reflux esophagitis         Past Surgical History:   Past Surgical History:   Procedure Laterality Date   • CHOLECYSTECTOMY     • HYSTEROSCOPY  1984    [facility]   • ROTATOR CUFF REPAIR      right   • SKIN CANCER EXCISION Right     shoulder       Family History: History reviewed. No pertinent family history.    Social History:   Social History     Socioeconomic History   • Marital status:    Tobacco Use   • Smoking status: Never Smoker   • Smokeless tobacco: Never Used   Substance and Sexual Activity   • Alcohol use: No   • Drug use: No   • Sexual activity: Yes       Medications:   Current Outpatient Medications:   •  aspirin (aspirin) 81 MG EC tablet, Take 81 mg by mouth Daily., Disp: , Rfl:   •  atorvastatin (LIPITOR) 40 MG tablet, , Disp: , Rfl:   •  cetirizine (zyrTEC) 10 MG tablet, Take 10 mg by mouth Daily., Disp: , Rfl:   •  diclofenac (CATAFLAM) 50 MG tablet, , Disp: , Rfl:   •  DULoxetine (CYMBALTA) 60 MG capsule, Take 60 mg by mouth Daily., Disp: , Rfl:   •  estradiol (ESTRACE) 1 MG tablet, Take 1 mg by mouth Daily., Disp: , Rfl:   •  fluticasone (FLONASE) 50 MCG/ACT nasal spray, 1 spray into the nostril(s) as directed by provider 2 (Two) Times a Day., Disp: , Rfl:   •  ipratropium (ATROVENT) 0.03 % nasal spray, ipratropium bromide 0.03 % nasal spray  Spray 2 sprays every day by intranasal route., Disp: , Rfl:   •  meclizine (ANTIVERT) 25 MG tablet, Take 1 tablet by mouth 3 (Three) Times a Day., Disp: , Rfl:   •  methocarbamol (ROBAXIN) 500 MG tablet, Take 500 mg by mouth 4 (Four) Times a Day., Disp: , Rfl:   •  ondansetron (ZOFRAN) 8 MG tablet, , Disp: , Rfl:   •  pantoprazole (PROTONIX) 40 MG EC tablet, pantoprazole 40 mg tablet,delayed release, Disp: , Rfl:     Allergies:   Allergies   Allergen Reactions   • Demerol [Meperidine]        The following portions of the patient's history were reviewed and updated as appropriate: allergies, current medications, past family history, past medical history, past social history, past  "surgical history and problem list.        Objective    Objective      Vital Signs:   Vitals:    08/09/22 1345   BP: 118/82   Weight: 75.8 kg (167 lb 1.7 oz)   Height: 162.6 cm (64.02\")       Ortho Exam:  General: no acute distress, comfortable  Vitals reviewed in chart    Musculoskeletal Exam    SIDE: LEFT SHOULDER  Shoulder Exam:    Tenderness: rotator cuff, deltoid insertion    Range of motion measurements (degrees)  Forward flexion/Abduction/External rotation at side/ER at 90/IR at 90/IR position  Active: Pain limited 130/130/45/80/60  Passive: Pain limited range of motion especially internal rotation    Painful arc of motion: yes  No evidence of septic joint  Pain with forward flexion and abduction greater: 90 degrees  Impingement testing Neer's test - positive/painful  Impingement testing Hawkin's test - positive/painful    Rotator Cuff Testing:  Tenderness to palpation at rotator cuff - YES  Rotator cuff testing Penny's test - positive  Rotator cuff testing External rotation - no  Rotator cuff testing Lag signs - no  Rotator cuff testing Belly press - no  Pain with abduction great than 90 degrees - yes    Scapular dyskinesis - present, abnormal scapular motion    Long head of the biceps testing:  Corral's test for biceps & Speed's test - painful  Bicipital groove tenderness to palpation/tenderness to palpation of biceps tendon - painful        Results Review:   Imaging Results (Last 24 Hours)     ** No results found for the last 24 hours. **        Procedures           Assessment / Plan      Assessment/Plan:   Problem List Items Addressed This Visit        Musculoskeletal and Injuries    Biceps tendinitis of left upper extremity    Relevant Orders    MRI Shoulder Left Without Contrast    Impingement syndrome of left shoulder    Relevant Orders    MRI Shoulder Left Without Contrast    Left shoulder pain    Relevant Orders    XR Shoulder 2+ View Left (Completed)    MRI Shoulder Left Without Contrast    Bursitis " of left shoulder    Relevant Orders    MRI Shoulder Left Without Contrast       Other    Rotator cuff injury, left, initial encounter - Primary    Relevant Orders    MRI Shoulder Left Without Contrast        LEFT SHOULDER  MRI of the shoulder is recommended.  Indication: suspected rotator cuff tear  The MRI is critical to evaluate for rotator cuff tearing and will help with possible surgical planning.  Pain and weakness with left shoulder motion      Follow Up: AFTER LEFT SHOULDER MRI      Jose Ochoa MD, FAAOS  Orthopedic Surgeon  Fellowship Trained Shoulder and Elbow Surgeon  [facility]  Orthopedics and Sports Medicine  [address] [place] [address], [address]  [place] [address]    08/18/22  16:54 EDT

## 2022-08-10 ENCOUNTER — TELEPHONE (OUTPATIENT)
Dept: ORTHOPEDIC SURGERY | Facility: CLINIC | Age: 72
End: 2022-08-10

## 2022-08-10 NOTE — TELEPHONE ENCOUNTER
Caller: AKOSUA YOUSSEF    Relationship: SELF    Best call back number: [phone]  What orders are you requesting (i.e. lab or imaging): MRI    In what timeframe would the patient need to come in: ASAP    Where will you receive your lab/imaging services: [facility]    Additional notes:

## 2022-09-12 DIAGNOSIS — M75.22 BICEPS TENDINITIS OF LEFT UPPER EXTREMITY: ICD-10-CM

## 2022-09-12 DIAGNOSIS — M75.52 BURSITIS OF LEFT SHOULDER: ICD-10-CM

## 2022-09-12 DIAGNOSIS — M75.42 IMPINGEMENT SYNDROME OF LEFT SHOULDER: ICD-10-CM

## 2022-09-12 DIAGNOSIS — S46.002A ROTATOR CUFF INJURY, LEFT, INITIAL ENCOUNTER: ICD-10-CM

## 2022-09-12 DIAGNOSIS — M25.512 LEFT SHOULDER PAIN, UNSPECIFIED CHRONICITY: ICD-10-CM

## 2022-09-13 ENCOUNTER — OFFICE VISIT (OUTPATIENT)
Dept: ORTHOPEDIC SURGERY | Facility: CLINIC | Age: 72
End: 2022-09-13

## 2022-09-13 VITALS
SYSTOLIC BLOOD PRESSURE: 130 MMHG | WEIGHT: 164.2 LBS | DIASTOLIC BLOOD PRESSURE: 88 MMHG | BODY MASS INDEX: 28.03 KG/M2 | HEIGHT: 64 IN

## 2022-09-13 DIAGNOSIS — M75.22 BICEPS TENDINITIS OF LEFT UPPER EXTREMITY: ICD-10-CM

## 2022-09-13 DIAGNOSIS — M75.42 IMPINGEMENT SYNDROME OF LEFT SHOULDER: ICD-10-CM

## 2022-09-13 DIAGNOSIS — M75.122 NONTRAUMATIC COMPLETE TEAR OF LEFT ROTATOR CUFF: Primary | ICD-10-CM

## 2022-09-13 DIAGNOSIS — S46.002A ROTATOR CUFF INJURY, LEFT, INITIAL ENCOUNTER: ICD-10-CM

## 2022-09-13 DIAGNOSIS — M75.52 BURSITIS OF LEFT SHOULDER: ICD-10-CM

## 2022-09-13 PROCEDURE — 20610 DRAIN/INJ JOINT/BURSA W/O US: CPT | Performed by: ORTHOPAEDIC SURGERY

## 2022-09-13 PROCEDURE — 99214 OFFICE O/P EST MOD 30 MIN: CPT | Performed by: ORTHOPAEDIC SURGERY

## 2022-09-13 RX ORDER — TRIAMCINOLONE ACETONIDE 40 MG/ML
40 INJECTION, SUSPENSION INTRA-ARTICULAR; INTRAMUSCULAR
Status: COMPLETED | OUTPATIENT
Start: 2022-09-13 | End: 2022-09-13

## 2022-09-13 RX ORDER — LIDOCAINE HYDROCHLORIDE 10 MG/ML
5 INJECTION, SOLUTION EPIDURAL; INFILTRATION; INTRACAUDAL; PERINEURAL
Status: COMPLETED | OUTPATIENT
Start: 2022-09-13 | End: 2022-09-13

## 2022-09-13 RX ADMIN — TRIAMCINOLONE ACETONIDE 40 MG: 40 INJECTION, SUSPENSION INTRA-ARTICULAR; INTRAMUSCULAR at 09:58

## 2022-09-13 RX ADMIN — LIDOCAINE HYDROCHLORIDE 5 ML: 10 INJECTION, SOLUTION EPIDURAL; INFILTRATION; INTRACAUDAL; PERINEURAL at 09:58

## 2022-09-13 NOTE — PROGRESS NOTES
Procedure   - Large Joint Arthrocentesis: L subacromial bursa on 9/13/2022 9:58 AM  Indications: pain  Details: 22 G needle, posterior approach  Medications: 40 mg triamcinolone acetonide 40 MG/ML; 5 mL lidocaine PF 1% 1 %  Outcome: tolerated well, no immediate complications  Procedure, treatment alternatives, risks and benefits explained, specific risks discussed. Consent was given by the patient. Immediately prior to procedure a time out was called to verify the correct patient, procedure, equipment, support staff and site/side marked as required. Patient was prepped and draped in the usual sterile fashion.

## 2022-09-13 NOTE — PROGRESS NOTES
[facility] [place] Office Follow Up       Office Follow Up Visit       Patient Name: Ernestina Barnhart    Chief Complaint:   Chief Complaint   Patient presents with   • Follow-up     MRI (9/9/22) follow up; Rotator cuff injury, left       Referring Physician: No ref. provider found    History of Present Illness:   It has been 1  month(s) since Ernestina Barnhart's last visit. Ernestina Barnhart returns to clinic today for F/U: follow-up of leftBody Part: shoulderReason: pain. The issue has been ongoing for 1 year(s). Ernestina Barnhart rates HIS/HER: herpain at 7/10 on the pain scale. Previous/current treatments: NSAIDS. Current symptoms:Symptoms: pain, swelling and stiffness. The pain is worse with sleeping, working and lying on affected side; ice and pain medication and/or NSAID improves the pain. Overall, he/she: sheis doing better. I have reviewed the patient's history of present illness as noted/entered above.     I have reviewed the patient's past medical history, surgical history, social history, family history, medications, and allergies as noted in the electronic medical record and as noted/entered.  I have reviewed the patient's review of systems as noted/enter and updated as noted in the patient's HPI.        Right shoulder chronic pain and dating up to 20 years.  Surgery with Dr. 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 Arroyo in 1997  Mechanism of Injury at Work: reaching up and injured  Current Work Status: retired  Treatments: w/c surgery     3/22/2022:   Chronic issues, but better since last visit  Injection 1/25/2022 - helped  Incredibly stoic        1/25/2022:  Right shoulder  I performed a peer to peer in the interim to get approval from Worker's Compensation for an MRI of the right shoulder which is now been completed.     Counseled and reviewed MRI counseled on operative versus nontreatment options     Prior:  I personally reviewed and interpreted an MRI within our system from 2019 indicating fairly significant supraspinatus full-thickness retracted tearing fairly medial type II tear, LHB intact     I personally interpreted x-rays of the biopsy system 12/15/2021.  Degenerative changes with calcific tendinitis but no significant superior migration despite the known history of full-thickness chronic rotator cuff tear        8/9/2022:  New diagnosis of left shoulder pain pain worsening over the last 3 months rating the pain is 8 out of 10.  Retired, no specific injury to the left shoulder.    9/13/2022:  LEFT SHOULDER  Full-thickness rotator cuff tear noted on her outside hospital MRI again this is for the left shoulder  Counseled on operative nonoperative measures.-She desires to avoid surgery      Subjective   Subjective      Review of Systems   Constitutional: Negative.  Negative for chills, fatigue and fever.   HENT: Negative.  Negative for congestion and dental problem.    Eyes: Negative.  Negative for blurred vision.   Respiratory: Negative.  Negative for shortness of breath.    Cardiovascular: Negative.  Negative for leg swelling.   Gastrointestinal: Negative.  Negative for abdominal pain.   Endocrine: Negative.  Negative for polyuria.   Genitourinary: Negative.  Negative for difficulty urinating.   Musculoskeletal: Positive for arthralgias.   Skin: Negative.     Allergic/Immunologic: Negative.    Neurological: Negative.    Hematological: Negative.  Negative for adenopathy.   Psychiatric/Behavioral: Negative.  Negative for behavioral problems.        Past Medical History:   Past Medical History:   Diagnosis Date   • Arthritis    • Reflux esophagitis        Past Surgical History:   Past Surgical History:   Procedure Laterality Date   • CHOLECYSTECTOMY     • HYSTEROSCOPY  1984    [facility]   • ROTATOR CUFF REPAIR      right   • SKIN CANCER EXCISION Right     shoulder       Family History: History reviewed. No pertinent family history.    Social History:   Social History     Socioeconomic History   • Marital status:    Tobacco Use   • Smoking status: Never Smoker   • Smokeless tobacco: Never Used   Vaping Use   • Vaping Use: Never used   Substance and Sexual Activity   • Alcohol use: No   • Drug use: No   • Sexual activity: Yes       Medications:   Current Outpatient Medications:   •  aspirin 81 MG EC tablet, Take 81 mg by mouth Daily., Disp: , Rfl:   •  atorvastatin (LIPITOR) 40 MG tablet, , Disp: , Rfl:   •  cetirizine (zyrTEC) 10 MG tablet, Take 10 mg by mouth Daily., Disp: , Rfl:   •  diclofenac (CATAFLAM) 50 MG tablet, , Disp: , Rfl:   •  DULoxetine (CYMBALTA) 60 MG capsule, Take 60 mg by mouth Daily., Disp: , Rfl:   •  estradiol (ESTRACE) 1 MG tablet, Take 1 mg by mouth Daily., Disp: , Rfl:   •  fluticasone (FLONASE) 50 MCG/ACT nasal spray, 1 spray into the nostril(s) as directed by provider 2 (Two) Times a Day., Disp: , Rfl:   •  ipratropium (ATROVENT) 0.03 % nasal spray, ipratropium bromide 0.03 % nasal spray  Spray 2 sprays every day by intranasal route., Disp: , Rfl:   •  meclizine (ANTIVERT) 25 MG tablet, Take 1 tablet by mouth 3 (Three) Times a Day., Disp: , Rfl:   •  methocarbamol (ROBAXIN) 500 MG tablet, Take 500 mg by mouth 4 (Four) Times a Day., Disp: , Rfl:   •  ondansetron (ZOFRAN) 8 MG tablet, , Disp: , Rfl:   •  pantoprazole (PROTONIX) 40 MG EC tablet,  "pantoprazole 40 mg tablet,delayed release, Disp: , Rfl:     Allergies:   Allergies   Allergen Reactions   • Demerol [Meperidine]        The following portions of the patient's history were reviewed and updated as appropriate: allergies, current medications, past family history, past medical history, past social history, past surgical history and problem list.        Objective    Objective      Vital Signs:   Vitals:    09/13/22 0938   BP: 130/88   Weight: 74.5 kg (164 lb 3.2 oz)   Height: 162.6 cm (64.02\")       Ortho Exam:  LEFT SHOULDER  Musculoskeletal Exam     SIDE: LEFT SHOULDER  Shoulder Exam:     Tenderness: rotator cuff, deltoid insertion, biceps     Range of motion measurements (degrees)  Forward flexion/Abduction/External rotation at side/ER at 90/IR at 90/IR position  Active: Pain limited 130/130/45/80/60  Passive: Pain limited range of motion especially internal rotation     Painful arc of motion: yes  No evidence of septic joint  Pain with forward flexion and abduction greater: 90 degrees  Stable exam compared to prior    Rotator Cuff Testing:  Tenderness to palpation at rotator cuff - YES  Rotator cuff testing Penny's test - positive  Rotator cuff testing External rotation - no  Rotator cuff testing Lag signs - no  Rotator cuff testing Belly press - no  Pain with abduction great than 90 degrees - yes     Scapular dyskinesis - present, abnormal scapular motion     Long head of the biceps testing:  Corral's test for biceps & Speed's test - painful  Bicipital groove tenderness to palpation/tenderness to palpation of biceps tendon - painful       Results Review:  Imaging Results (Last 24 Hours)     ** No results found for the last 24 hours. **          Left shoulder MRI [facility] radiology  Date of procedure 9/9/2022  Full-thickness with mild retraction supraspinatus delamination of the infraspinatus with tearing as well.  Bursitis    Procedures    LEFT SHOULDER SUBACROMIAL SPACE INJECTION: Risks and " benefits of a shoulder subacromial space injection were discussed and the patient desired to proceed. Verbal consent was obtained. The patient understood the risk of infection, potential skin changes, bump in blood glucose especially with diabetes, nerve injury, possibility of increased pain in the short term, and possible incomplete pain relief.  Using sterile technique, the shoulder subacromial space was injected from a posterior approach with 1mL of 40 mg triamcinolone acetonide 40 MG/ML and 4cc of lidocaine with aspiration prior to injection. The patient tolerated the procedure without difficulty.  CPT CODE 34388 for major joint aspiration/injection          Assessment / Plan      Assessment/Plan:   Problem List Items Addressed This Visit        Musculoskeletal and Injuries    Biceps tendinitis of left upper extremity    Relevant Orders    Ambulatory Referral to Physical Therapy Evaluate and treat, Ortho (Completed)    Impingement syndrome of left shoulder    Relevant Orders    Ambulatory Referral to Physical Therapy Evaluate and treat, Ortho (Completed)    Bursitis of left shoulder    Relevant Orders    Ambulatory Referral to Physical Therapy Evaluate and treat, Ortho (Completed)    Nontraumatic complete tear of left rotator cuff - Primary    Relevant Orders    Ambulatory Referral to Physical Therapy Evaluate and treat, Ortho (Completed)       Other    Rotator cuff injury, left, initial encounter    Relevant Orders    Ambulatory Referral to Physical Therapy Evaluate and treat, Ortho (Completed)          LEFT SHOULDER rotator cuff tear  Counseled on operative vs nonoperative measures  She desires to avoid surgery, corticosteroid injection performed today  PT Rx  Counseled on rotator cuff repair    Follow Up: 2-3 months      Jose Ochoa MD, FAAOS  Orthopedic Surgeon  Fellowship Trained Shoulder and Elbow Surgeon  [facility]  Orthopedics and Sports Medicine  [address] [place] [address]  [address]  [place] [address]    09/13/22  10:11 EDT

## 2022-11-22 ENCOUNTER — OFFICE VISIT (OUTPATIENT)
Dept: ORTHOPEDIC SURGERY | Facility: CLINIC | Age: 72
End: 2022-11-22

## 2022-11-22 VITALS
BODY MASS INDEX: 27.66 KG/M2 | WEIGHT: 162 LBS | HEIGHT: 64 IN | SYSTOLIC BLOOD PRESSURE: 124 MMHG | DIASTOLIC BLOOD PRESSURE: 80 MMHG

## 2022-11-22 DIAGNOSIS — M75.42 IMPINGEMENT SYNDROME OF LEFT SHOULDER: ICD-10-CM

## 2022-11-22 DIAGNOSIS — M75.22 BICEPS TENDINITIS OF LEFT UPPER EXTREMITY: ICD-10-CM

## 2022-11-22 DIAGNOSIS — S46.002A ROTATOR CUFF INJURY, LEFT, INITIAL ENCOUNTER: ICD-10-CM

## 2022-11-22 DIAGNOSIS — M75.122 NONTRAUMATIC COMPLETE TEAR OF LEFT ROTATOR CUFF: Primary | ICD-10-CM

## 2022-11-22 DIAGNOSIS — M25.531 RIGHT WRIST PAIN: ICD-10-CM

## 2022-11-22 DIAGNOSIS — M25.512 LEFT SHOULDER PAIN, UNSPECIFIED CHRONICITY: ICD-10-CM

## 2022-11-22 DIAGNOSIS — M65.4 TENOSYNOVITIS, DE QUERVAIN: ICD-10-CM

## 2022-11-22 DIAGNOSIS — M75.52 BURSITIS OF LEFT SHOULDER: ICD-10-CM

## 2022-11-22 PROCEDURE — 99213 OFFICE O/P EST LOW 20 MIN: CPT | Performed by: ORTHOPAEDIC SURGERY

## 2022-11-22 PROCEDURE — 20550 NJX 1 TENDON SHEATH/LIGAMENT: CPT | Performed by: ORTHOPAEDIC SURGERY

## 2022-11-22 RX ORDER — LIDOCAINE HYDROCHLORIDE 10 MG/ML
3 INJECTION, SOLUTION EPIDURAL; INFILTRATION; INTRACAUDAL; PERINEURAL
Status: COMPLETED | OUTPATIENT
Start: 2022-11-22 | End: 2022-11-22

## 2022-11-22 RX ORDER — CYCLOBENZAPRINE HCL 5 MG
TABLET ORAL
COMMUNITY
Start: 2022-10-21

## 2022-11-22 RX ORDER — TRIAMCINOLONE ACETONIDE 40 MG/ML
40 INJECTION, SUSPENSION INTRA-ARTICULAR; INTRAMUSCULAR
Status: COMPLETED | OUTPATIENT
Start: 2022-11-22 | End: 2022-11-22

## 2022-11-22 RX ADMIN — TRIAMCINOLONE ACETONIDE 40 MG: 40 INJECTION, SUSPENSION INTRA-ARTICULAR; INTRAMUSCULAR at 10:49

## 2022-11-22 RX ADMIN — LIDOCAINE HYDROCHLORIDE 3 ML: 10 INJECTION, SOLUTION EPIDURAL; INFILTRATION; INTRACAUDAL; PERINEURAL at 10:49

## 2022-11-22 NOTE — PROGRESS NOTES
Procedure   - Hand/Upper Extremity Injection: R extensor compartment 1 for de Quervain's tenosynovitis on 11/22/2022 10:49 AM  Indications: pain  Details: 25 G needle, radial approach  Medications: 3 mL lidocaine PF 1% 1 %; 40 mg triamcinolone acetonide 40 MG/ML  Outcome: tolerated well, no immediate complications  Procedure, treatment alternatives, risks and benefits explained, specific risks discussed. Consent was given by the patient. Immediately prior to procedure a time out was called to verify the correct patient, procedure, equipment, support staff and site/side marked as required. Patient was prepped and draped in the usual sterile fashion.

## 2022-11-22 NOTE — PROGRESS NOTES
[facility] Orthopaedic Surgery Office Follow Up       Office Follow Up Visit       Patient Name: Ernestina Barnhart    Chief Complaint:   Chief Complaint   Patient presents with   • Follow-up     2 month follow up -- Impingement syndrome of left shoulder       Referring Physician: No ref. provider found    History of Present Illness:   It has been 2  month(s) since Ernestina Barnhart's last visit. Ernestina Barnhart returns to clinic today for F/U: follow-up of leftBody Part: shoulderReason: pain. The issue has been ongoing for 2 year(s). Ernestina Barnhart rates HIS/HER: herpain at 8/10 on the pain scale. Previous/current treatments: NSAIDS and physical therapy. Current symptoms:Symptoms: pain and swelling. The pain is worse with sleeping and lying on affected side; resting, ice and pain medication and/or NSAID improves the pain. Overall, he/she: sheis doing better. I have reviewed the patient's history of present illness as noted/entered above.     I have reviewed the patient's past medical history, surgical history, social history, family history, medications, and allergies as noted in the electronic medical record and as noted/entered.  I have reviewed the patient's review of systems as noted/enter and updated as noted in the patient's HPI.        Right shoulder chronic pain and dating up to 20 years.  Surgery with Dr. 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 Arroyo in 1997  Mechanism of Injury at Work: reaching up and injured  Current Work Status: retired  Treatments: w/c surgery     3/22/2022:   Chronic issues, but better since last visit  Injection 1/25/2022 - helped  Incredibly stoic        1/25/2022:  Right shoulder  I performed a peer to peer in the interim to get approval from Worker's Compensation for an MRI of the right shoulder which is now been completed.     Counseled and reviewed MRI counseled on operative versus nontreatment options     Prior:  I personally reviewed and interpreted an MRI within our system from 2019 indicating fairly significant supraspinatus full-thickness retracted tearing fairly medial type II tear, LHB intact     I personally interpreted x-rays of the biopsy system 12/15/2021.  Degenerative changes with calcific tendinitis but no significant superior migration despite the known history of full-thickness chronic rotator cuff tear        8/9/2022:  New diagnosis of left shoulder pain pain worsening over the last 3 months rating the pain is 8 out of 10.  Retired, no specific injury to the left shoulder.     9/13/2022:  LEFT SHOULDER  Full-thickness rotator cuff tear noted on her outside hospital MRI again this is for the left shoulder  Counseled on operative nonoperative measures.-She desires to avoid surgery    11/22/2022:  Left shoulder improved/better  Known history of full-thickness rotator cuff tearing.  Patient desires to avoid surgery.  Incredible clinically      Subjective   Subjective      Review of Systems   Constitutional: Negative.  Negative for chills, fatigue and fever.   HENT: Negative.  Negative for congestion and dental problem.    Eyes: Negative.  Negative for blurred vision.   Respiratory: Negative.  Negative for shortness of breath.    Cardiovascular: Negative.  Negative for leg swelling.   Gastrointestinal: Negative.  Negative for abdominal  pain.   Endocrine: Negative.  Negative for polyuria.   Genitourinary: Negative.  Negative for difficulty urinating.   Musculoskeletal: Positive for arthralgias.   Skin: Negative.    Allergic/Immunologic: Negative.    Neurological: Negative.    Hematological: Negative.  Negative for adenopathy.   Psychiatric/Behavioral: Negative.  Negative for behavioral problems.        Past Medical History:   Past Medical History:   Diagnosis Date   • Arthritis    • Reflux esophagitis        Past Surgical History:   Past Surgical History:   Procedure Laterality Date   • CHOLECYSTECTOMY     • HYSTEROSCOPY  1984    [facility]   • ROTATOR CUFF REPAIR      right   • SKIN CANCER EXCISION Right     shoulder       Family History: History reviewed. No pertinent family history.    Social History:   Social History     Socioeconomic History   • Marital status:    Tobacco Use   • Smoking status: Never   • Smokeless tobacco: Never   Vaping Use   • Vaping Use: Never used   Substance and Sexual Activity   • Alcohol use: No   • Drug use: No   • Sexual activity: Yes       Medications:   Current Outpatient Medications:   •  aspirin 81 MG EC tablet, Take 81 mg by mouth Daily., Disp: , Rfl:   •  atorvastatin (LIPITOR) 40 MG tablet, , Disp: , Rfl:   •  cetirizine (zyrTEC) 10 MG tablet, Take 10 mg by mouth Daily., Disp: , Rfl:   •  cyclobenzaprine (FLEXERIL) 5 MG tablet, , Disp: , Rfl:   •  diclofenac (CATAFLAM) 50 MG tablet, , Disp: , Rfl:   •  estradiol (ESTRACE) 1 MG tablet, Take 1 mg by mouth Daily., Disp: , Rfl:   •  fluticasone (FLONASE) 50 MCG/ACT nasal spray, 1 spray into the nostril(s) as directed by provider 2 (Two) Times a Day., Disp: , Rfl:   •  ipratropium (ATROVENT) 0.03 % nasal spray, ipratropium bromide 0.03 % nasal spray  Spray 2 sprays every day by intranasal route., Disp: , Rfl:   •  meclizine (ANTIVERT) 25 MG tablet, Take 1 tablet by mouth 3 (Three) Times a Day., Disp: , Rfl:   •  methocarbamol (ROBAXIN) 500 MG tablet, Take 500 mg by  "mouth 4 (Four) Times a Day., Disp: , Rfl:   •  ondansetron (ZOFRAN) 8 MG tablet, , Disp: , Rfl:   •  pantoprazole (PROTONIX) 40 MG EC tablet, pantoprazole 40 mg tablet,delayed release, Disp: , Rfl:   •  DULoxetine (CYMBALTA) 60 MG capsule, Take 60 mg by mouth Daily., Disp: , Rfl:     Allergies:   Allergies   Allergen Reactions   • Demerol [Meperidine]        The following portions of the patient's history were reviewed and updated as appropriate: allergies, current medications, past family history, past medical history, past social history, past surgical history and problem list.        Objective    Objective      Vital Signs:   Vitals:    11/22/22 0956   BP: 124/80   Weight: 73.5 kg (162 lb)   Height: 162.6 cm (64.02\")       Ortho Exam:  LEFT SHOULDER  Excellent AROM/PROM  5/5 strength  Excellent clinical result for the left shoulder.  Right hand does have evidence of Dequervain's tenosynovitis    Results Review:  Imaging Results (Last 24 Hours)     ** No results found for the last 24 hours. **          Procedures  RIGHT hand/wrist --under sterile conditions utilized corticosteroid injection  1mL of 40 mg triamcinolone acetonide 40 MG/ML and 3cc of lidocaine with aspiration prior to injection.  Patient tolerated procedure well.  Tendon sheath injection utilized 2-2.5 cc.  Thumb extensor side, Dequervains tenosynovitis injection      Assessment / Plan      Assessment/Plan:   Problem List Items Addressed This Visit        Musculoskeletal and Injuries    Biceps tendinitis of left upper extremity    Impingement syndrome of left shoulder    Left shoulder pain    Bursitis of left shoulder    Nontraumatic complete tear of left rotator cuff - Primary    Tenosynovitis, de Quervain    Right wrist pain       Other    Rotator cuff injury, left, initial encounter       LEFT SHOULDER  Excellent clinical and subjective result  She understands that tear can become irreparable over time, but excellent to date    Right hand new " diagnosis Dequervain's tenosynovitis.  Had offered a consultation with one of our hand providers and she has not done that yet.  She was very much in favor proceeding with an injection today.  She can still keep her hand follow-up    Follow Up: as needed      Jose Ochoa MD, FAAOS  Orthopedic Surgeon  Fellowship Trained Shoulder and Elbow Surgeon  [facility]  Orthopedics and Sports Medicine  [facility], [address]  [place] [address]    11/22/22  11:00 EST

## 2023-01-27 ENCOUNTER — OFFICE VISIT (OUTPATIENT)
Dept: ORTHOPEDIC SURGERY | Facility: CLINIC | Age: 73
End: 2023-01-27
Payer: MEDICARE

## 2023-01-27 VITALS
BODY MASS INDEX: 28.24 KG/M2 | HEIGHT: 64 IN | WEIGHT: 165.4 LBS | SYSTOLIC BLOOD PRESSURE: 128 MMHG | DIASTOLIC BLOOD PRESSURE: 82 MMHG

## 2023-01-27 DIAGNOSIS — M75.122 NONTRAUMATIC COMPLETE TEAR OF LEFT ROTATOR CUFF: Primary | ICD-10-CM

## 2023-01-27 DIAGNOSIS — S46.002A ROTATOR CUFF INJURY, LEFT, INITIAL ENCOUNTER: ICD-10-CM

## 2023-01-27 DIAGNOSIS — M75.52 BURSITIS OF LEFT SHOULDER: ICD-10-CM

## 2023-01-27 DIAGNOSIS — M75.42 IMPINGEMENT SYNDROME OF LEFT SHOULDER: ICD-10-CM

## 2023-01-27 DIAGNOSIS — M75.22 BICEPS TENDINITIS OF LEFT UPPER EXTREMITY: ICD-10-CM

## 2023-01-27 PROCEDURE — 20610 DRAIN/INJ JOINT/BURSA W/O US: CPT | Performed by: ORTHOPAEDIC SURGERY

## 2023-01-27 PROCEDURE — 99214 OFFICE O/P EST MOD 30 MIN: CPT | Performed by: ORTHOPAEDIC SURGERY

## 2023-01-27 RX ORDER — TRIAMCINOLONE ACETONIDE 40 MG/ML
40 INJECTION, SUSPENSION INTRA-ARTICULAR; INTRAMUSCULAR
Status: COMPLETED | OUTPATIENT
Start: 2023-01-27 | End: 2023-01-27

## 2023-01-27 RX ORDER — LIDOCAINE HYDROCHLORIDE 10 MG/ML
5 INJECTION, SOLUTION EPIDURAL; INFILTRATION; INTRACAUDAL; PERINEURAL
Status: COMPLETED | OUTPATIENT
Start: 2023-01-27 | End: 2023-01-27

## 2023-01-27 RX ADMIN — LIDOCAINE HYDROCHLORIDE 5 ML: 10 INJECTION, SOLUTION EPIDURAL; INFILTRATION; INTRACAUDAL; PERINEURAL at 10:11

## 2023-01-27 RX ADMIN — TRIAMCINOLONE ACETONIDE 40 MG: 40 INJECTION, SUSPENSION INTRA-ARTICULAR; INTRAMUSCULAR at 10:11

## 2023-01-27 NOTE — PROGRESS NOTES
[facility] Orth[facility] Office Follow Up       Office Follow Up Visit       Patient Name: Ernestina Barnhart    Chief Complaint:   Chief Complaint   Patient presents with   • Follow-up     2 month f/u--Nontraumatic complete tear of left rotator cuff        Referring Physician: No ref. provider found    History of Present Illness:   It has been 2  month(s) since Ernestina Barnhart's last visit. Ernestina Barnhart returns to clinic today for F/U: follow-up of leftBody Part: shoulderReason: pain. The issue has been ongoing for 2 week(s). Ernestina Barnhart rates HIS/HER: herpain at 8/10 on the pain scale. Previous/current treatments: NSAIDS and physical therapy. Current symptoms:Symptoms: pain and swelling. The pain is worse with sleeping and lying on affected side; ice, heat and pain medication and/or NSAID improves the pain. Overall, he/she: sheis doing left shoulder doing worse.  I have reviewed the patient's history of present illness as noted/entered above.     I have reviewed the patient's past medical history, surgical history, social history, family history, medications, and allergies as noted in the electronic medical record and as noted/entered.  I have reviewed the patient's review of systems as noted/enter and updated as noted in the patient's HPI.        Right shoulder chronic pain and dating up to 20 years.  Surgery with Dr. 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 Arroyo in 1997  Mechanism of Injury at Work: reaching up and injured  Current Work Status: retired  Treatments: w/c surgery     3/22/2022:   Chronic issues, but better since last visit  Injection 1/25/2022 - helped  Incredibly stoic        1/25/2022:  Right shoulder  I performed a peer to peer in the interim to get approval from Worker's Compensation for an MRI of the right shoulder which is now been completed.     Counseled and reviewed MRI counseled on operative versus nontreatment options     Prior:  I personally reviewed and interpreted an MRI within our system from 2019 indicating fairly significant supraspinatus full-thickness retracted tearing fairly medial type II tear, LHB intact     I personally interpreted x-rays of the biopsy system 12/15/2021.  Degenerative changes with calcific tendinitis but no significant superior migration despite the known history of full-thickness chronic rotator cuff tear        8/9/2022:  New diagnosis of left shoulder pain pain worsening over the last 3 months rating the pain is 8 out of 10.  Retired, no specific injury to the left shoulder.     9/13/2022:  LEFT SHOULDER  Full-thickness rotator cuff tear noted on her outside hospital MRI again this is for the left shoulder  Counseled on operative nonoperative measures.-She desires to avoid surgery     11/22/2022:  Left shoulder improved/better  Known history of full-thickness rotator cuff tearing.  Patient desires to avoid surgery.  Incredible clinically    1/27/2023:  LEFT SHOULDER --known chronic full-thickness tear, exacerbation and pain increased left shoulder      Subjective   Subjective      Review of Systems   Constitutional: Negative.  Negative for chills, fatigue and fever.   HENT: Negative.  Negative for congestion and dental problem.    Eyes: Negative.  Negative for blurred vision.   Respiratory: Negative.  Negative for shortness of  breath.    Cardiovascular: Negative.  Negative for leg swelling.   Gastrointestinal: Negative.  Negative for abdominal pain.   Endocrine: Negative.  Negative for polyuria.   Genitourinary: Negative.  Negative for difficulty urinating.   Musculoskeletal: Positive for arthralgias.   Skin: Negative.    Allergic/Immunologic: Negative.    Neurological: Negative.    Hematological: Negative.  Negative for adenopathy.   Psychiatric/Behavioral: Negative.  Negative for behavioral problems.        Past Medical History:   Past Medical History:   Diagnosis Date   • Arthritis    • Reflux esophagitis        Past Surgical History:   Past Surgical History:   Procedure Laterality Date   • CHOLECYSTECTOMY     • HYSTEROSCOPY  1984    [facility]   • ROTATOR CUFF REPAIR      right   • SKIN CANCER EXCISION Right     shoulder       Family History: History reviewed. No pertinent family history.    Social History:   Social History     Socioeconomic History   • Marital status:    Tobacco Use   • Smoking status: Never   • Smokeless tobacco: Never   Vaping Use   • Vaping Use: Never used   Substance and Sexual Activity   • Alcohol use: No   • Drug use: No   • Sexual activity: Yes       Medications:   Current Outpatient Medications:   •  aspirin 81 MG EC tablet, Take 81 mg by mouth Daily., Disp: , Rfl:   •  atorvastatin (LIPITOR) 40 MG tablet, , Disp: , Rfl:   •  cetirizine (zyrTEC) 10 MG tablet, Take 10 mg by mouth Daily., Disp: , Rfl:   •  cyclobenzaprine (FLEXERIL) 5 MG tablet, , Disp: , Rfl:   •  diclofenac (CATAFLAM) 50 MG tablet, , Disp: , Rfl:   •  DULoxetine (CYMBALTA) 60 MG capsule, Take 60 mg by mouth Daily., Disp: , Rfl:   •  estradiol (ESTRACE) 1 MG tablet, Take 1 mg by mouth Daily., Disp: , Rfl:   •  fluticasone (FLONASE) 50 MCG/ACT nasal spray, 1 spray into the nostril(s) as directed by provider 2 (Two) Times a Day., Disp: , Rfl:   •  ipratropium (ATROVENT) 0.03 % nasal spray, ipratropium bromide 0.03 % nasal spray  Spray 2 sprays  "every day by intranasal route., Disp: , Rfl:   •  meclizine (ANTIVERT) 25 MG tablet, Take 1 tablet by mouth 3 (Three) Times a Day., Disp: , Rfl:   •  methocarbamol (ROBAXIN) 500 MG tablet, Take 500 mg by mouth 4 (Four) Times a Day., Disp: , Rfl:   •  ondansetron (ZOFRAN) 8 MG tablet, , Disp: , Rfl:   •  pantoprazole (PROTONIX) 40 MG EC tablet, pantoprazole 40 mg tablet,delayed release, Disp: , Rfl:     Allergies:   Allergies   Allergen Reactions   • Demerol [Meperidine]        The following portions of the patient's history were reviewed and updated as appropriate: allergies, current medications, past family history, past medical history, past social history, past surgical history and problem list.        Objective    Objective      Vital Signs:   Vitals:    01/27/23 0949   BP: 128/82   Weight: 75 kg (165 lb 6.4 oz)   Height: 162.6 cm (64.02\")       Ortho Exam:  Left shoulder generalized pain positive Neer positive Vanegas pain with Jobes testing but good deltoid compensation.    Painful arc of motion more than 100 degrees    Results Review:  Imaging Results (Last 24 Hours)     ** No results found for the last 24 hours. **              Procedures    LEFT SHOULDER SUBACROMIAL SPACE INJECTION: Risks and benefits of a shoulder subacromial space injection were discussed and the patient desired to proceed. Verbal consent was obtained. The patient understood the risk of infection, potential skin changes, bump in blood glucose especially with diabetes, nerve injury, possibility of increased pain in the short term, and possible incomplete pain relief.  Using sterile technique, the shoulder subacromial space was injected from a posterior approach with 1mL of 40 mg triamcinolone acetonide 40 MG/ML and 4cc of lidocaine with aspiration prior to injection. The patient tolerated the procedure without difficulty.  CPT CODE 72690 for major joint aspiration/injection          Assessment / Plan      Assessment/Plan:   Problem List " Items Addressed This Visit        Musculoskeletal and Injuries    Biceps tendinitis of left upper extremity    Impingement syndrome of left shoulder    Bursitis of left shoulder    Nontraumatic complete tear of left rotator cuff - Primary       Other    Rotator cuff injury, left, initial encounter       Left shoulder known chronic rotator cuff tear desires to stick with conservative course.  Counseled on treatment options.  She will continue with home scapular program.    She may reach out regarding her contralateral shoulder-right shoulder that is under Worker's Compensation and see me in the past for this.    Follow Up: She will keep me updated pending progress      Jose Ochoa MD, FAAOS  Orthopedic Surgeon  Fellowship Trained Shoulder and Elbow Surgeon  [facility]iatric  Orthopedics and Sports Medicine  [address] [place] [address], [address]  [place] [address]    01/27/23  10:27 EST

## 2023-01-27 NOTE — PROGRESS NOTES
Procedure   - Large Joint Arthrocentesis: L subacromial bursa on 1/27/2023 10:11 AM  Indications: pain  Details: (23 g) needle, posterior approach  Medications: 5 mL lidocaine PF 1% 1 %; 40 mg triamcinolone acetonide 40 MG/ML  Outcome: tolerated well, no immediate complications  Procedure, treatment alternatives, risks and benefits explained, specific risks discussed. Consent was given by the patient. Immediately prior to procedure a time out was called to verify the correct patient, procedure, equipment, support staff and site/side marked as required. Patient was prepped and draped in the usual sterile fashion.

## 2023-02-28 ENCOUNTER — OFFICE VISIT (OUTPATIENT)
Dept: ORTHOPEDIC SURGERY | Facility: CLINIC | Age: 73
End: 2023-02-28
Payer: OTHER MISCELLANEOUS

## 2023-02-28 VITALS
DIASTOLIC BLOOD PRESSURE: 90 MMHG | HEIGHT: 64 IN | WEIGHT: 161.8 LBS | BODY MASS INDEX: 27.62 KG/M2 | SYSTOLIC BLOOD PRESSURE: 134 MMHG

## 2023-02-28 DIAGNOSIS — M75.121 NONTRAUMATIC COMPLETE TEAR OF RIGHT ROTATOR CUFF: Primary | ICD-10-CM

## 2023-02-28 PROCEDURE — 20610 DRAIN/INJ JOINT/BURSA W/O US: CPT | Performed by: ORTHOPAEDIC SURGERY

## 2023-02-28 RX ORDER — CEPHALEXIN 500 MG/1
CAPSULE ORAL
COMMUNITY
Start: 2023-02-20

## 2023-02-28 RX ORDER — TRIAMCINOLONE ACETONIDE 40 MG/ML
40 INJECTION, SUSPENSION INTRA-ARTICULAR; INTRAMUSCULAR
Status: COMPLETED | OUTPATIENT
Start: 2023-02-28 | End: 2023-02-28

## 2023-02-28 RX ORDER — LIDOCAINE HYDROCHLORIDE 10 MG/ML
5 INJECTION, SOLUTION EPIDURAL; INFILTRATION; INTRACAUDAL; PERINEURAL
Status: COMPLETED | OUTPATIENT
Start: 2023-02-28 | End: 2023-02-28

## 2023-02-28 RX ADMIN — TRIAMCINOLONE ACETONIDE 40 MG: 40 INJECTION, SUSPENSION INTRA-ARTICULAR; INTRAMUSCULAR at 14:43

## 2023-02-28 RX ADMIN — LIDOCAINE HYDROCHLORIDE 5 ML: 10 INJECTION, SOLUTION EPIDURAL; INFILTRATION; INTRACAUDAL; PERINEURAL at 14:43

## 2023-02-28 NOTE — PROGRESS NOTES
Procedure   Large Joint Arthrocentesis: R subacromial bursa  Date/Time: 2/28/2023 2:43 PM  Consent given by: patient  Site marked: site marked  Timeout: Immediately prior to procedure a time out was called to verify the correct patient, procedure, equipment, support staff and site/side marked as required   Supporting Documentation  Indications: pain   Procedure Details  Location: shoulder - R subacromial bursa  Preparation: Patient was prepped and draped in the usual sterile fashion  Needle size: 23 G  Approach: posterior  Medications administered: 5 mL lidocaine PF 1% 1 %; 40 mg triamcinolone acetonide 40 MG/ML  Patient tolerance: patient tolerated the procedure well with no immediate complications

## 2023-02-28 NOTE — PROGRESS NOTES
[facility] Office Follow Up       Office Follow Up Visit       Patient Name: Ernestina Barnhart    Chief Complaint:   Chief Complaint   Patient presents with   • Follow-up     1 month follow up- complete tear of left rotator cuff, Biceps tendinitis of left upper extremity, Impingement syndrome of left shoulder, and Bursitis of left shoulder       Referring Physician: No ref. provider found    History of Present Illness:   It has been 1  month(s) since Ernestina Barnhart's last visit. Ernestina Barnhart returns to clinic today for F/U: follow-up of leftBody Part: shoulderReason: pain. The issue has been ongoing for 20 year(s). Ernestina Barnhart rates HIS/HER: herpain at 8/10 on the pain scale. Previous/current treatments: NSAIDS and physical therapy. Current symptoms:Symptoms: pain and stiffness. The pain is worse with sleeping and lying on affected side; ice and pain medication and/or NSAID improves the pain. Overall, he/she: sheis doing the same.  I have reviewed the patient's history of present illness as noted/entered above.    I have reviewed the patient's past medical history, surgical history, social history, family history, medications, and allergies as noted in the electronic medical record and as noted/entered.  I have reviewed the patient's review of systems as noted/enter and updated as noted in the patient's HPI.    Right shoulder chronic pain and dating up to 20 years.  Surgery with Dr. 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 Arroyo in 1997  Mechanism of Injury at Work: reaching up and injured  Current Work Status: retired  Treatments: w/c surgery     3/22/2022:   Chronic issues, but better since last visit  Injection 1/25/2022 - helped  Incredibly stoic        1/25/2022:  Right shoulder  I performed a peer to peer in the interim to get approval from Worker's Compensation for an MRI of the right shoulder which is now been completed.     Counseled and reviewed MRI counseled on operative versus nontreatment options     Prior:  I personally reviewed and interpreted an MRI within our system from 2019 indicating fairly significant supraspinatus full-thickness retracted tearing fairly medial type II tear, LHB intact     I personally interpreted x-rays of the biopsy system 12/15/2021.  Degenerative changes with calcific tendinitis but no significant superior migration despite the known history of full-thickness chronic rotator cuff tear        8/9/2022:  New diagnosis of left shoulder pain pain worsening over the last 3 months rating the pain is 8 out of 10.  Retired, no specific injury to the left shoulder.     9/13/2022:  LEFT SHOULDER  Full-thickness rotator cuff tear noted on her outside hospital MRI again this is for the left shoulder  Counseled on operative nonoperative measures.-She desires to avoid surgery     11/22/2022:  Left shoulder improved/better  Known history of full-thickness rotator cuff tearing.  Patient desires to avoid surgery.  Incredible clinically     1/27/2023:  LEFT SHOULDER --known chronic full-thickness tear, exacerbation and pain increased left shoulder      2/28/2023:  Right shoulder --right shoulder painful this is part of Worker's Compensation injury.  Quite a chronic injury.  She is responded to injections for these massive chronic rotator cuff  tears on the right      Subjective   Subjective      Review of Systems   Constitutional: Negative.  Negative for chills, fatigue and fever.   HENT: Negative.  Negative for congestion and dental problem.    Eyes: Negative.  Negative for blurred vision.   Respiratory: Negative.  Negative for shortness of breath.    Cardiovascular: Negative.  Negative for leg swelling.   Gastrointestinal: Negative.  Negative for abdominal pain.   Endocrine: Negative.  Negative for polyuria.   Genitourinary: Negative.  Negative for difficulty urinating.   Musculoskeletal: Positive for arthralgias.   Skin: Negative.    Allergic/Immunologic: Negative.    Neurological: Negative.    Hematological: Negative.  Negative for adenopathy.   Psychiatric/Behavioral: Negative.  Negative for behavioral problems.        Past Medical History:   Past Medical History:   Diagnosis Date   • Arthritis    • Reflux esophagitis        Past Surgical History:   Past Surgical History:   Procedure Laterality Date   • CHOLECYSTECTOMY     • HYSTEROSCOPY  1984    SHIMON   • ROTATOR CUFF REPAIR      right   • SKIN CANCER EXCISION Right     shoulder       Family History: History reviewed. No pertinent family history.    Social History:   Social History     Socioeconomic History   • Marital status:    Tobacco Use   • Smoking status: Never   • Smokeless tobacco: Never   Vaping Use   • Vaping Use: Never used   Substance and Sexual Activity   • Alcohol use: No   • Drug use: No   • Sexual activity: Yes       Medications:   Current Outpatient Medications:   •  aspirin 81 MG EC tablet, Take 1 tablet by mouth Daily., Disp: , Rfl:   •  atorvastatin (LIPITOR) 40 MG tablet, , Disp: , Rfl:   •  cephalexin (KEFLEX) 500 MG capsule, , Disp: , Rfl:   •  cetirizine (zyrTEC) 10 MG tablet, Take 1 tablet by mouth Daily., Disp: , Rfl:   •  cyclobenzaprine (FLEXERIL) 5 MG tablet, , Disp: , Rfl:   •  diclofenac (CATAFLAM) 50 MG tablet, , Disp: , Rfl:   •  DULoxetine (CYMBALTA) 60 MG  "capsule, Take 1 capsule by mouth Daily., Disp: , Rfl:   •  estradiol (ESTRACE) 1 MG tablet, Take 1 tablet by mouth Daily., Disp: , Rfl:   •  fluticasone (FLONASE) 50 MCG/ACT nasal spray, 1 spray into the nostril(s) as directed by provider 2 (Two) Times a Day., Disp: , Rfl:   •  ipratropium (ATROVENT) 0.03 % nasal spray, ipratropium bromide 0.03 % nasal spray  Spray 2 sprays every day by intranasal route., Disp: , Rfl:   •  meclizine (ANTIVERT) 25 MG tablet, Take 1 tablet by mouth 3 (Three) Times a Day., Disp: , Rfl:   •  methocarbamol (ROBAXIN) 500 MG tablet, Take 1 tablet by mouth 4 (Four) Times a Day., Disp: , Rfl:   •  ondansetron (ZOFRAN) 8 MG tablet, , Disp: , Rfl:   •  pantoprazole (PROTONIX) 40 MG EC tablet, pantoprazole 40 mg tablet,delayed release, Disp: , Rfl:     Allergies:   Allergies   Allergen Reactions   • Demerol [Meperidine]        The following portions of the patient's history were reviewed and updated as appropriate: allergies, current medications, past family history, past medical history, past social history, past surgical history and problem list.        Objective    Objective      Vital Signs:   Vitals:    02/28/23 1417   BP: 134/90   Weight: 73.4 kg (161 lb 12.8 oz)   Height: 162.6 cm (64.02\")       Ortho Exam:  Right shoulder pain but stoic with reasonable range of motion despite known chronic rotator cuff tears    Results Review:  Imaging Results (Last 24 Hours)     ** No results found for the last 24 hours. **          Procedures    Right SHOULDER SUBACROMIAL SPACE INJECTION: Risks and benefits of a shoulder subacromial space injection were discussed and the patient desired to proceed. Verbal consent was obtained. The patient understood the risk of infection, potential skin changes, bump in blood glucose especially with diabetes, nerve injury, possibility of increased pain in the short term, and possible incomplete pain relief.  Using sterile technique, the shoulder subacromial space was " injected from a posterior approach with 1mL of 40 mg triamcinolone acetonide 40 MG/ML and 4cc of lidocaine with aspiration prior to injection. The patient tolerated the procedure without difficulty.  CPT CODE 93665 for major joint aspiration/injection          Assessment / Plan      Assessment/Plan:   Problem List Items Addressed This Visit        Musculoskeletal and Injuries    Nontraumatic complete tear of right rotator cuff - Primary       RIGHT SHOULDER - repeat SA injection 2/28/2023  Patient has responded to injections over the years and only reaches out for them when she has night pain.  She strongly desires to avoid any additional surgery which is reasonable.    Follow Up: As needed      Jose Ochoa MD, FAAOS  Orthopedic Surgeon  Fellowship Trained Shoulder and Elbow Surgeon  [facility]  Orthopedics and Sports Medicine  [address], [address]  [place] [address]    02/28/23  16:16 EST

## 2023-04-04 ENCOUNTER — PREP FOR SURGERY (OUTPATIENT)
Dept: OTHER | Facility: HOSPITAL | Age: 73
End: 2023-04-04
Payer: MEDICARE

## 2023-04-04 DIAGNOSIS — H25.12 NUCLEAR SCLEROTIC CATARACT OF LEFT EYE: Primary | ICD-10-CM

## 2023-04-04 RX ORDER — SODIUM CHLORIDE 0.9 % (FLUSH) 0.9 %
1-10 SYRINGE (ML) INJECTION AS NEEDED
Status: CANCELLED | OUTPATIENT
Start: 2023-04-04

## 2023-04-04 RX ORDER — CYCLOPENT/TROPIC/PHEN/KETR/WAT 1%-1%-2.5%
1 DROPS (EA) OPHTHALMIC (EYE)
Status: CANCELLED | OUTPATIENT
Start: 2023-04-04 | End: 2023-04-04

## 2023-04-04 RX ORDER — SODIUM CHLORIDE 0.9 % (FLUSH) 0.9 %
10 SYRINGE (ML) INJECTION EVERY 12 HOURS SCHEDULED
Status: CANCELLED | OUTPATIENT
Start: 2023-04-04

## 2023-04-04 RX ORDER — TETRACAINE HYDROCHLORIDE 5 MG/ML
1 SOLUTION OPHTHALMIC SEE ADMIN INSTRUCTIONS
Status: CANCELLED | OUTPATIENT
Start: 2023-04-04

## 2023-04-04 RX ORDER — PREDNISOLONE ACETATE 10 MG/ML
1 SUSPENSION/ DROPS OPHTHALMIC SEE ADMIN INSTRUCTIONS
Status: CANCELLED | OUTPATIENT
Start: 2023-04-04

## 2023-04-04 RX ORDER — SODIUM CHLORIDE 9 MG/ML
40 INJECTION, SOLUTION INTRAVENOUS AS NEEDED
Status: CANCELLED | OUTPATIENT
Start: 2023-04-04

## 2023-04-07 PROBLEM — H25.12 NUCLEAR SCLEROTIC CATARACT OF LEFT EYE: Status: ACTIVE | Noted: 2023-04-07

## 2023-04-14 RX ORDER — SERTRALINE HYDROCHLORIDE 25 MG/1
25 TABLET, FILM COATED ORAL DAILY
COMMUNITY

## 2023-04-14 NOTE — PRE-PROCEDURE INSTRUCTIONS
PAT phone history completed with pt for upcoming procedure on 4/17/23, with Dr. Lund.     PAT PASS GIVEN/REVIEWED WITH PT.  VERBALIZED UNDERSTANDING OF THE FOLLOWING:  DO NOT EAT, DRINK, SMOKE, USE SMOKELESS TOBACCO OR CHEW GUM AFTER MIDNIGHT THE NIGHT BEFORE SURGERY.  THIS ALSO INCLUDES HARD CANDIES AND MINTS.    DO NOT SHAVE THE AREA TO BE OPERATED ON AT LEAST 48 HOURS PRIOR TO THE PROCEDURE.  DO NOT WEAR MAKE UP OR NAIL POLISH.  DO NOT LEAVE IN ANY PIERCING OR WEAR JEWELRY THE DAY OF SURGERY.      DO NOT USE ADHESIVES IF YOU WEAR DENTURES.    DO NOT WEAR EYE CONTACTS; BRING IN YOUR GLASSES.    ONLY TAKE MEDICATION THE MORNING OF YOUR PROCEDURE IF INSTRUCTED BY YOUR SURGEON WITH ENOUGH WATER TO SWALLOW THE MEDICATION.  IF YOUR SURGEON DID NOT SPECIFY WHICH MEDICATIONS TO TAKE, YOU WILL NEED TO CALL THEIR OFFICE FOR FURTHER INSTRUCTIONS AND DO AS THEY INSTRUCT.    LEAVE ANYTHING YOU CONSIDER VALUABLE AT HOME.    YOU WILL NEED TO ARRANGE FOR SOMEONE TO DRIVE YOU HOME AFTER SURGERY.  IT IS RECOMMENDED THAT YOU DO NOT DRIVE, WORK, DRINK ALCOHOL OR MAKE MAJOR DECISIONS FOR AT LEAST 24 HOURS AFTER YOUR PROCEDURE IS COMPLETE.      THE DAY OF YOUR PROCEDURE, BRING IN THE FOLLOWING IF APPLICABLE:   PICTURE ID AND INSURANCE/MEDICARE OR MEDICAID CARDS/ANY CO-PAY THAT MAY BE DUE   COPY OF ADVANCED DIRECTIVE/LIVING WILL/POWER OR    CPAP/BIPAP/INHALERS   SKIN PREP SHEET   YOUR PREADMISSION TESTING PASS (IF NOT A PHONE HISTORY)

## 2023-04-17 ENCOUNTER — HOSPITAL ENCOUNTER (OUTPATIENT)
Facility: HOSPITAL | Age: 73
Setting detail: HOSPITAL OUTPATIENT SURGERY
Discharge: HOME | End: 2023-04-17
Attending: OPHTHALMOLOGY | Admitting: OPHTHALMOLOGY
Payer: MEDICARE

## 2023-04-17 ENCOUNTER — ANESTHESIA EVENT (OUTPATIENT)
Dept: PERIOP | Facility: HOSPITAL | Age: 73
End: 2023-04-17
Payer: MEDICARE

## 2023-04-17 ENCOUNTER — ANESTHESIA (OUTPATIENT)
Dept: PERIOP | Facility: HOSPITAL | Age: 73
End: 2023-04-17
Payer: MEDICARE

## 2023-04-17 VITALS
SYSTOLIC BLOOD PRESSURE: 167 MMHG | WEIGHT: 163 LBS | OXYGEN SATURATION: 97 % | TEMPERATURE: 97.3 F | RESPIRATION RATE: 16 BRPM | DIASTOLIC BLOOD PRESSURE: 73 MMHG | HEART RATE: 69 BPM | HEIGHT: 65 IN | BODY MASS INDEX: 27.16 KG/M2

## 2023-04-17 DIAGNOSIS — H25.12 NUCLEAR SCLEROTIC CATARACT OF LEFT EYE: ICD-10-CM

## 2023-04-17 PROCEDURE — 25010000002 PROPOFOL 10 MG/ML EMULSION: Performed by: NURSE ANESTHETIST, CERTIFIED REGISTERED

## 2023-04-17 PROCEDURE — V2632 POST CHMBR INTRAOCULAR LENS: HCPCS | Performed by: OPHTHALMOLOGY

## 2023-04-17 DEVICE — LENS MONOFOCAL IQ CC60WF230: Type: IMPLANTABLE DEVICE | Site: POSTERIOR CHAMBER | Status: FUNCTIONAL

## 2023-04-17 RX ORDER — SODIUM CHLORIDE 0.9 % (FLUSH) 0.9 %
1-10 SYRINGE (ML) INJECTION AS NEEDED
Status: DISCONTINUED | OUTPATIENT
Start: 2023-04-17 | End: 2023-04-17 | Stop reason: HOSPADM

## 2023-04-17 RX ORDER — PROPOFOL 10 MG/ML
VIAL (ML) INTRAVENOUS AS NEEDED
Status: DISCONTINUED | OUTPATIENT
Start: 2023-04-17 | End: 2023-04-17 | Stop reason: SURG

## 2023-04-17 RX ORDER — CYCLOPENT/TROPIC/PHEN/KETR/WAT 1%-1%-2.5%
1 DROPS (EA) OPHTHALMIC (EYE)
Status: COMPLETED | OUTPATIENT
Start: 2023-04-17 | End: 2023-04-17

## 2023-04-17 RX ORDER — BALANCED SALT SOLUTION 6.4; .75; .48; .3; 3.9; 1.7 MG/ML; MG/ML; MG/ML; MG/ML; MG/ML; MG/ML
SOLUTION OPHTHALMIC AS NEEDED
Status: DISCONTINUED | OUTPATIENT
Start: 2023-04-17 | End: 2023-04-17 | Stop reason: HOSPADM

## 2023-04-17 RX ORDER — KETAMINE HYDROCHLORIDE 50 MG/ML
INJECTION, SOLUTION, CONCENTRATE INTRAMUSCULAR; INTRAVENOUS AS NEEDED
Status: DISCONTINUED | OUTPATIENT
Start: 2023-04-17 | End: 2023-04-17 | Stop reason: SURG

## 2023-04-17 RX ORDER — SODIUM CHLORIDE, SODIUM LACTATE, POTASSIUM CHLORIDE, CALCIUM CHLORIDE 600; 310; 30; 20 MG/100ML; MG/100ML; MG/100ML; MG/100ML
1000 INJECTION, SOLUTION INTRAVENOUS CONTINUOUS
Status: DISCONTINUED | OUTPATIENT
Start: 2023-04-17 | End: 2023-04-17 | Stop reason: HOSPADM

## 2023-04-17 RX ORDER — PREDNISOLONE ACETATE 10 MG/ML
1 SUSPENSION/ DROPS OPHTHALMIC SEE ADMIN INSTRUCTIONS
Status: DISCONTINUED | OUTPATIENT
Start: 2023-04-17 | End: 2023-04-17 | Stop reason: HOSPADM

## 2023-04-17 RX ORDER — LIDOCAINE HYDROCHLORIDE 40 MG/ML
INJECTION, SOLUTION RETROBULBAR; TOPICAL AS NEEDED
Status: DISCONTINUED | OUTPATIENT
Start: 2023-04-17 | End: 2023-04-17 | Stop reason: HOSPADM

## 2023-04-17 RX ORDER — SODIUM CHLORIDE 0.9 % (FLUSH) 0.9 %
10 SYRINGE (ML) INJECTION EVERY 12 HOURS SCHEDULED
Status: DISCONTINUED | OUTPATIENT
Start: 2023-04-17 | End: 2023-04-17 | Stop reason: HOSPADM

## 2023-04-17 RX ORDER — SODIUM CHLORIDE 9 MG/ML
40 INJECTION, SOLUTION INTRAVENOUS AS NEEDED
Status: DISCONTINUED | OUTPATIENT
Start: 2023-04-17 | End: 2023-04-17 | Stop reason: HOSPADM

## 2023-04-17 RX ORDER — SODIUM CHLORIDE 0.9 % (FLUSH) 0.9 %
10 SYRINGE (ML) INJECTION AS NEEDED
Status: DISCONTINUED | OUTPATIENT
Start: 2023-04-17 | End: 2023-04-17 | Stop reason: HOSPADM

## 2023-04-17 RX ORDER — ACETAZOLAMIDE 500 MG/1
500 CAPSULE, EXTENDED RELEASE ORAL ONCE
Status: COMPLETED | OUTPATIENT
Start: 2023-04-17 | End: 2023-04-17

## 2023-04-17 RX ORDER — NEOMYCIN SULFATE, POLYMYXIN B SULFATE, AND DEXAMETHASONE 3.5; 10000; 1 MG/G; [USP'U]/G; MG/G
OINTMENT OPHTHALMIC AS NEEDED
Status: DISCONTINUED | OUTPATIENT
Start: 2023-04-17 | End: 2023-04-17 | Stop reason: HOSPADM

## 2023-04-17 RX ORDER — PREDNISOLONE ACETATE 10 MG/ML
SUSPENSION/ DROPS OPHTHALMIC AS NEEDED
Status: DISCONTINUED | OUTPATIENT
Start: 2023-04-17 | End: 2023-04-17 | Stop reason: HOSPADM

## 2023-04-17 RX ORDER — TETRACAINE HYDROCHLORIDE 5 MG/ML
SOLUTION OPHTHALMIC AS NEEDED
Status: DISCONTINUED | OUTPATIENT
Start: 2023-04-17 | End: 2023-04-17 | Stop reason: HOSPADM

## 2023-04-17 RX ORDER — ONDANSETRON 2 MG/ML
4 INJECTION INTRAMUSCULAR; INTRAVENOUS ONCE AS NEEDED
Status: DISCONTINUED | OUTPATIENT
Start: 2023-04-17 | End: 2023-04-17 | Stop reason: HOSPADM

## 2023-04-17 RX ORDER — LIDOCAINE HYDROCHLORIDE 20 MG/ML
INJECTION, SOLUTION INTRAVENOUS AS NEEDED
Status: DISCONTINUED | OUTPATIENT
Start: 2023-04-17 | End: 2023-04-17 | Stop reason: SURG

## 2023-04-17 RX ORDER — TETRACAINE HYDROCHLORIDE 5 MG/ML
1 SOLUTION OPHTHALMIC SEE ADMIN INSTRUCTIONS
Status: COMPLETED | OUTPATIENT
Start: 2023-04-17 | End: 2023-04-17

## 2023-04-17 RX ADMIN — TETRACAINE HYDROCHLORIDE 1 DROP: 5 SOLUTION OPHTHALMIC at 13:51

## 2023-04-17 RX ADMIN — Medication 1 DROP: at 13:53

## 2023-04-17 RX ADMIN — PROPOFOL 130 MG: 10 INJECTION, EMULSION INTRAVENOUS at 15:15

## 2023-04-17 RX ADMIN — ACETAZOLAMIDE 500 MG: 500 CAPSULE, EXTENDED RELEASE ORAL at 15:39

## 2023-04-17 RX ADMIN — Medication 1 DROP: at 14:03

## 2023-04-17 RX ADMIN — TETRACAINE HYDROCHLORIDE 1 DROP: 5 SOLUTION OPHTHALMIC at 13:52

## 2023-04-17 RX ADMIN — KETAMINE HYDROCHLORIDE 15 MG: 50 INJECTION, SOLUTION INTRAMUSCULAR; INTRAVENOUS at 15:15

## 2023-04-17 RX ADMIN — Medication 1 DROP: at 13:58

## 2023-04-17 RX ADMIN — SODIUM CHLORIDE, POTASSIUM CHLORIDE, SODIUM LACTATE AND CALCIUM CHLORIDE 1000 ML: 600; 310; 30; 20 INJECTION, SOLUTION INTRAVENOUS at 13:58

## 2023-04-17 RX ADMIN — LIDOCAINE HYDROCHLORIDE 60 MG: 20 INJECTION, SOLUTION INTRAVENOUS at 15:15

## 2023-04-17 NOTE — ANESTHESIA PREPROCEDURE EVALUATION
Anesthesia Evaluation     Patient summary reviewed and Nursing notes reviewed   no history of anesthetic complications:  NPO Solid Status: > 8 hours  NPO Liquid Status: > 8 hours           Airway   Mallampati: II  TM distance: >3 FB  Neck ROM: full  no difficulty expected and Possible difficult intubation  Dental - normal exam     Pulmonary - negative pulmonary ROS and normal exam   Cardiovascular - negative cardio ROS and normal exam    Rhythm: regular  Rate: normal        Neuro/Psych- negative ROS  GI/Hepatic/Renal/Endo    (+)  GERD,      Musculoskeletal     Abdominal    Substance History - negative use     OB/GYN negative ob/gyn ROS         Other   arthritis,                      Anesthesia Plan    ASA 2     MAC     (Pt told that intravenous sedation will be used as the primary anesthetic along with local anesthesia if necessary. Every effort will be made to make sure the patient is comfortable.     The patient was told they may or may not have recall for the procedure. It was further explained that if the MAC was not adequate that a general anesthetic with either an LMA or endotracheal tube would be required.     Will proceed with the plan of care.)  intravenous induction     Anesthetic plan, risks, benefits, and alternatives have been provided, discussed and informed consent has been obtained with: patient.        CODE STATUS:

## 2023-04-17 NOTE — DISCHARGE INSTRUCTIONS
Post Operative Cataract Instructions     Left Eye  POST OPERATIVE INSTRUCTIONS  You have received anesthesia today. DO NOT drive, drink alcohol, sign legal documents.   After surgery, your eye will not hurt. It may feel scratchy, sticky or uncomfortable. Your eye will be sensitive to light.  Most people see better 1-3 days after the procedure, but it could take 3 weeks to get the full benefits and reach your visual potential. If your vision is blurry for a few days it is normal, and means you may have swelling outside or inside the eye. For some patients, a bubble is placed and there will be blurriness.   You should receive a post-op kit with tape and an eye shield. Wear the shield for the first 3 nights after surgery to keep you from rubbing the eye.  You may wear glasses or sunglasses during the day.  You must keep eye protected at all times.  Most people are able to return to their normal routine 1-3 days after surgery, however, due to the brain adjusting to your new vision you may have trouble judging distances and want to be careful when driving and going up and downstairs.   You can shower and wash your hair the day after surgery. Keep water, shampoo, hair spray and shaving lotion out of the eye, especially for the first week.   If eyes become stuck together after surgery you may soak with warm cloth.  During the first week, you should AVOID:   Rubbing or putting pressure on your eye.  Eye make-up, face cream or lotion, hair coloring or perms  Strenuous activities, such as running or lifting weights, as to avoid sweat from getting in the eye. Avoid swimming, hot tubs, fumes or marty conditions.   Sleeping on operative side.  Excessive sneezing or coughing.  Hanging the head down for periods of time. Keep your head above your heart.    Some discomfort and blurred vision after surgery are normal, but if you have any unusual pain, swelling, bleeding or sudden decrease in vision, contact our office immediately.      Emergency assistance is available at any time by calling:    Dr.Mark Ashely Lund  [phone]    If unable to reach call [facility] @ [phone]    You have been prescribed an eye drop to use after surgery, please follow these instructions and bring eye drops and instructions with you to post op appointment:    Difluprednate (DUREZOL) 0.05 %. Shake well before use.    USE 1 DROP 4 (FOUR) TIMES A DAY FOR 1 WEEK THEN STARTING WEEK 2, ONLY USE 2 (TWO) TIMES A DAY UNTIL YOU RUN OUT OF DROPS.     PLACE A JOON IN THE DAY COLUMN EACH TIME YOU USE TO KEEP ON SCHEDULE    WEEK 1-USE 4  (FOUR) TIMES A DAY DAY 1  []   []    []   []     DAY 2  []   []    []   []  DAY 3  []   []    []   []  DAY 4  []   []    []   []  DAY 5  []   []    []   []  DAY 6  []   []    []   []  DAY 7  []   []    []   []      WEEK 2-USE 2 (TWO)  TIMES A DAY DAY 1  []   []  DAY 2  []   []  DAY 3  []   []  DAY 4  []   []  DAY 5  []   []  DAY 6  []   []  DAY 7  []   []      WEEK 3-USE 2 (TWO) TIMES A DAY DAY 1  []   []  DAY 2  []   []  DAY 3  []   []  DAY 4  []   []  DAY 5  []   []  DAY 6  []   []  DAY 7  []   []      WEEK 4-USE 2 (TWO)TIMES A DAY DAY 1  []   []  DAY 2  []   []  DAY 3  []   []  DAY 4  []   []  DAY 5  []   []  DAY 6  []   []  DAY 7  []   []       Please follow all post op instructions and follow up appointment time from your physician's office included in your discharge packet.  .  No pushing, pulling, tugging,  heavy lifting, or strenuous activity.  No major decision making, driving, or drinking alcoholic beverages for 24 hours. ( due to the medications you have  received)  Always use good hand hygiene/washing techniques.  NO driving while taking pain medications.    * if you have an incision:  Check your incision area every day for signs of infection.   Check for:  * more redness, swelling, or pain  *more fluid or blood  *warmth  *pus or bad smell To  assist you in voiding:  Drink plenty of fluids  Listen to running water while attempting to void.    If you are unable to urinate and you have an uncomfortable urge to void or it has been   6 hours since you were discharged, return to the Emergency Room

## 2023-04-17 NOTE — OP NOTE
OPERATIVE NOTE    Date of Procedure: 4/17/2023  Patient Name: Ernestina Barnhart  Patient MRN: [id number]  YOB: 1950     Preoperative Diagnosis: Left nuclear sclerotic cataract.     Postoperative Diagnosis: Left nuclear sclerotic cataract.     Procedure Performed: Phacoemulsification with implantation of a  foldable posterior chamber intraocular lens, Left eye.     Surgeon: Leon Lund MD     Anesthesia:  Monitored Anesthesia Care (MAC)      Brief History and Indication: The patient presents with a history of past progressive loss of vision.  Patient was diagnosed with cataract and requests removal for increased ability to read and see.     Operation Description: The patient was taken to the OR and prepped and draped in the usual sterile ophthalmic fashion. A lid speculum was placed in the eye.  A #75 blade was then used to make a stab incision two o’clock hours from the intended temporal clear cornea groove. The anterior chamber was then inflated with a Viscoelastic. A metal microkeratome blade was then used to enter the anterior chamber at the temporal clear cornea site. A three level tunnel incision was made. A curvilinear capsulorrhexis was then performed with a bent cystotome needle and capsulorrhexis forceps.  BSS on a 30 gauge bent cannula was used to hydro-dissect, and hydro-delineate the lens. Good fluid waves and hydro-delineation were noted. Phacoemulsification was then used to remove nuclear material without complications. The residual cortical and lenticular material was then removed with irrigation and aspiration. Viscoelastics were then used to inflate the bag in a soft shell technique. A PCIOL was injected into the bag. Post-implantation, there were no rents or tears in the bag and the lens was noted to be stable and centered. The residual Viscoelastic was then removed with irrigation and aspiration.  The wound was checked and found to be without leaks. Therefore a Polydex  ointment and one drop of Durezol eye drop was placed in the eye.     Implant Information:   Implant Name Type Inv. Item Serial No.  Lot No. LRB No. Used Action   LENS MONOFOCAL IQ [id number] - [id number] - [id number] Implant LENS MONOFOCAL IQ [id number] CLARISSA NA Left 1 Implanted       Complications: None    Estimated Blood Loss:  Less than 1 cc.       []   Changed to complex procedure due to: []  hypermature, white cataract. Blue dye was used. []   small iris. A Malyugin Ring was used.    Discharge and Condition  The patient was transported to same day surgery in excellent condition and scheduled for follow-up tomorrow morning. The patient was given instructions on use of eye drops for the operative eye and was specifically instructed to call Dr. Lund at his office or home for any nausea, vomiting, headache, decreased visual acuity, or pain, or if the patient had any general concerns.    Leon Lund MD  4/17/2023

## 2023-04-17 NOTE — ANESTHESIA POSTPROCEDURE EVALUATION
Patient: Ernestina Barnhart    Procedure Summary     Date: 04/17/23 Room / Location: [facility] OR 1 /  [facility]    Anesthesia Start: 1511 Anesthesia Stop: 1528    Procedure: CATARACT PHACO EXTRACTION WITH INTRAOCULAR LENS IMPLANT LEFT (Left: Eye) Diagnosis:       Nuclear sclerotic cataract of left eye      (Nuclear sclerotic cataract of left eye [H25.12])    Surgeons: Leon Lund MD Provider: Leon Krishna CRNA    Anesthesia Type: MAC ASA Status: 2          Anesthesia Type: MAC    Vitals  Vitals Value Taken Time   /70 04/17/23 1532   Temp 97.3 °F (36.3 °C) 04/17/23 1532   Pulse 76 04/17/23 1532   Resp 16 04/17/23 1532   SpO2 94 % 04/17/23 1532           Post Anesthesia Care and Evaluation    Patient location during evaluation: bedside  Patient participation: complete - patient participated  Level of consciousness: awake  Pain score: 1  Pain management: adequate    Airway patency: patent  Anesthetic complications: No anesthetic complications  PONV Status: controlled  Cardiovascular status: acceptable  Respiratory status: acceptable  Hydration status: acceptable  Post Neuraxial Block status: Motor and sensory function returned to baseline  Comments: See post procedural Nursing Record for Post operative Vital Signs as per protocol

## 2023-04-17 NOTE — H&P
[facility]         History and Physical    Patient Name: Ernestina Barnhart  MRN: [id number]  : 1950  Gender: female     HPI: Patient complaint of PPLOV Left eye diagnosed with cataract. Patient requests PHACO PCIOL for Increase of VA/ADL.    History:    Past Medical History:   Diagnosis Date   • Arthritis    • Elevated cholesterol    • GERD (gastroesophageal reflux disease)    • Reflux esophagitis        Past Surgical History:   Procedure Laterality Date   • CHOLECYSTECTOMY     • COLONOSCOPY     • HYSTERECTOMY     • HYSTEROSCOPY      SHIMON   • ROTATOR CUFF REPAIR      right   • SKIN CANCER EXCISION Right     shoulder       Social History     Socioeconomic History   • Marital status:    Tobacco Use   • Smoking status: Never   • Smokeless tobacco: Never   Vaping Use   • Vaping Use: Never used   Substance and Sexual Activity   • Alcohol use: No   • Drug use: No   • Sexual activity: Defer       History reviewed. No pertinent family history.    Prior to Admission Medications:  Medications Prior to Admission   Medication Sig Dispense Refill Last Dose   • aspirin 81 MG EC tablet Take 1 tablet by mouth Daily.   2023 at 0800   • atorvastatin (LIPITOR) 40 MG tablet Take 1 tablet by mouth Every Night.   2023   • cetirizine (zyrTEC) 10 MG tablet Take 1 tablet by mouth Daily.   2023   • cyclobenzaprine (FLEXERIL) 5 MG tablet Take 1 tablet by mouth 2 (Two) Times a Day As Needed.   2023   • diclofenac (CATAFLAM) 50 MG tablet Take 1 tablet by mouth Daily As Needed.      • estradiol (ESTRACE) 1 MG tablet Take 1 tablet by mouth Daily.   2023   • fluticasone (FLONASE) 50 MCG/ACT nasal spray 1 spray into the nostril(s) as directed by provider 2 (Two) Times a Day.   Past Week   • ipratropium (ATROVENT) 0.03 % nasal spray 1 spray Daily.   Past Week   • meclizine (ANTIVERT) 25 MG tablet Take 1 tablet by mouth 3 (Three) Times a Day As Needed.   2023   •  methocarbamol (ROBAXIN) 500 MG tablet Take 1 tablet by mouth 4 (Four) Times a Day As Needed.   4/16/2023   • pantoprazole (PROTONIX) 40 MG EC tablet 1 tablet Daily.   4/16/2023   • sertraline (ZOLOFT) 25 MG tablet Take 1 tablet by mouth Daily.   4/16/2023   • cephalexin (KEFLEX) 500 MG capsule       • DULoxetine (CYMBALTA) 60 MG capsule Take 1 capsule by mouth Daily.      • ondansetron (ZOFRAN) 8 MG tablet Take 1 tablet by mouth Every 8 (Eight) Hours As Needed.   Unknown       Allergies:  Allergies   Allergen Reactions   • Demerol [Meperidine] Rash        Vitals: Temp:  [98.1 °F (36.7 °C)] 98.1 °F (36.7 °C)  Heart Rate:  [69] 69  Resp:  [16] 16  BP: (167)/(74) 167/74    Review of Systems:   Within Normal Limits Abnormal   HEENT [x]    []     Cardiovascular [x]   []     Gastrointestinal [x]   []     Genitourinary [x]   []     Neurologic [x]   []     Pulmonary [x]   []       Physical Exam:   Within Normal Limits Abnormal   HEENT [x]    []     Heart [x]   []     Lungs [x]   []     Abdomen [x]   []     Extremities [x]   []       Impression: Left nuclear sclerotic cataract.     Plan: CATARACT PHACO EXTRACTION WITH INTRAOCULAR LENS IMPLANT LEFT (Left)     Leon Lund MD  4/17/2023

## 2023-04-21 ENCOUNTER — PREP FOR SURGERY (OUTPATIENT)
Dept: OTHER | Facility: HOSPITAL | Age: 73
End: 2023-04-21
Payer: MEDICARE

## 2023-04-21 DIAGNOSIS — H25.11 NUCLEAR SCLEROTIC CATARACT OF RIGHT EYE: Primary | ICD-10-CM

## 2023-04-21 RX ORDER — PREDNISOLONE ACETATE 10 MG/ML
1 SUSPENSION/ DROPS OPHTHALMIC SEE ADMIN INSTRUCTIONS
Status: CANCELLED | OUTPATIENT
Start: 2023-04-21

## 2023-04-21 RX ORDER — SODIUM CHLORIDE 9 MG/ML
40 INJECTION, SOLUTION INTRAVENOUS AS NEEDED
Status: CANCELLED | OUTPATIENT
Start: 2023-04-21

## 2023-04-21 RX ORDER — CYCLOPENT/TROPIC/PHEN/KETR/WAT 1%-1%-2.5%
1 DROPS (EA) OPHTHALMIC (EYE)
Status: CANCELLED | OUTPATIENT
Start: 2023-04-21 | End: 2023-04-21

## 2023-04-21 RX ORDER — TETRACAINE HYDROCHLORIDE 5 MG/ML
1 SOLUTION OPHTHALMIC SEE ADMIN INSTRUCTIONS
Status: CANCELLED | OUTPATIENT
Start: 2023-04-21

## 2023-04-21 RX ORDER — SODIUM CHLORIDE 0.9 % (FLUSH) 0.9 %
1-10 SYRINGE (ML) INJECTION AS NEEDED
Status: CANCELLED | OUTPATIENT
Start: 2023-04-21

## 2023-04-21 RX ORDER — SODIUM CHLORIDE 0.9 % (FLUSH) 0.9 %
10 SYRINGE (ML) INJECTION EVERY 12 HOURS SCHEDULED
Status: CANCELLED | OUTPATIENT
Start: 2023-04-21

## 2023-04-28 NOTE — PRE-PROCEDURE INSTRUCTIONS
PAT phone history completed with pt for upcoming procedure on 5/1/23, with Dr. Lund.     PAT PASS GIVEN/REVIEWED WITH PT.  VERBALIZED UNDERSTANDING OF THE FOLLOWING:  DO NOT EAT, DRINK, SMOKE, USE SMOKELESS TOBACCO OR CHEW GUM AFTER MIDNIGHT THE NIGHT BEFORE SURGERY.  THIS ALSO INCLUDES HARD CANDIES AND MINTS.    DO NOT SHAVE THE AREA TO BE OPERATED ON AT LEAST 48 HOURS PRIOR TO THE PROCEDURE.  DO NOT WEAR MAKE UP OR NAIL POLISH.  DO NOT LEAVE IN ANY PIERCING OR WEAR JEWELRY THE DAY OF SURGERY.      DO NOT USE ADHESIVES IF YOU WEAR DENTURES.    DO NOT WEAR EYE CONTACTS; BRING IN YOUR GLASSES.    ONLY TAKE MEDICATION THE MORNING OF YOUR PROCEDURE IF INSTRUCTED BY YOUR SURGEON WITH ENOUGH WATER TO SWALLOW THE MEDICATION.  IF YOUR SURGEON DID NOT SPECIFY WHICH MEDICATIONS TO TAKE, YOU WILL NEED TO CALL THEIR OFFICE FOR FURTHER INSTRUCTIONS AND DO AS THEY INSTRUCT.    LEAVE ANYTHING YOU CONSIDER VALUABLE AT HOME.    YOU WILL NEED TO ARRANGE FOR SOMEONE TO DRIVE YOU HOME AFTER SURGERY.  IT IS RECOMMENDED THAT YOU DO NOT DRIVE, WORK, DRINK ALCOHOL OR MAKE MAJOR DECISIONS FOR AT LEAST 24 HOURS AFTER YOUR PROCEDURE IS COMPLETE.      THE DAY OF YOUR PROCEDURE, BRING IN THE FOLLOWING IF APPLICABLE:   PICTURE ID AND INSURANCE/MEDICARE OR MEDICAID CARDS/ANY CO-PAY THAT MAY BE DUE   COPY OF ADVANCED DIRECTIVE/LIVING WILL/POWER OR    CPAP/BIPAP/INHALERS   SKIN PREP SHEET   YOUR PREADMISSION TESTING PASS (IF NOT A PHONE HISTORY)

## 2023-05-01 ENCOUNTER — HOSPITAL ENCOUNTER (OUTPATIENT)
Facility: HOSPITAL | Age: 73
Setting detail: HOSPITAL OUTPATIENT SURGERY
Discharge: HOME | End: 2023-05-01
Attending: OPHTHALMOLOGY | Admitting: OPHTHALMOLOGY
Payer: MEDICARE

## 2023-05-01 ENCOUNTER — ANESTHESIA EVENT (OUTPATIENT)
Dept: PERIOP | Facility: HOSPITAL | Age: 73
End: 2023-05-01
Payer: MEDICARE

## 2023-05-01 ENCOUNTER — ANESTHESIA (OUTPATIENT)
Dept: PERIOP | Facility: HOSPITAL | Age: 73
End: 2023-05-01
Payer: MEDICARE

## 2023-05-01 VITALS
TEMPERATURE: 97.3 F | WEIGHT: 162.92 LBS | HEIGHT: 65 IN | RESPIRATION RATE: 17 BRPM | OXYGEN SATURATION: 98 % | BODY MASS INDEX: 27.14 KG/M2 | DIASTOLIC BLOOD PRESSURE: 78 MMHG | SYSTOLIC BLOOD PRESSURE: 126 MMHG | HEART RATE: 66 BPM

## 2023-05-01 DIAGNOSIS — H25.11 NUCLEAR SCLEROTIC CATARACT OF RIGHT EYE: ICD-10-CM

## 2023-05-01 PROCEDURE — 25010000002 PROPOFOL 10 MG/ML EMULSION: Performed by: NURSE ANESTHETIST, CERTIFIED REGISTERED

## 2023-05-01 PROCEDURE — V2632 POST CHMBR INTRAOCULAR LENS: HCPCS | Performed by: OPHTHALMOLOGY

## 2023-05-01 PROCEDURE — 25010000002 MIDAZOLAM PER 1MG: Performed by: NURSE ANESTHETIST, CERTIFIED REGISTERED

## 2023-05-01 DEVICE — LENS MONOFOCAL IQ CC60WF225: Type: IMPLANTABLE DEVICE | Site: POSTERIOR CHAMBER | Status: FUNCTIONAL

## 2023-05-01 RX ORDER — TETRACAINE HYDROCHLORIDE 5 MG/ML
SOLUTION OPHTHALMIC AS NEEDED
Status: DISCONTINUED | OUTPATIENT
Start: 2023-05-01 | End: 2023-05-01 | Stop reason: HOSPADM

## 2023-05-01 RX ORDER — BALANCED SALT SOLUTION 6.4; .75; .48; .3; 3.9; 1.7 MG/ML; MG/ML; MG/ML; MG/ML; MG/ML; MG/ML
SOLUTION OPHTHALMIC AS NEEDED
Status: DISCONTINUED | OUTPATIENT
Start: 2023-05-01 | End: 2023-05-01 | Stop reason: HOSPADM

## 2023-05-01 RX ORDER — SODIUM CHLORIDE 0.9 % (FLUSH) 0.9 %
1-10 SYRINGE (ML) INJECTION AS NEEDED
Status: DISCONTINUED | OUTPATIENT
Start: 2023-05-01 | End: 2023-05-01 | Stop reason: HOSPADM

## 2023-05-01 RX ORDER — DIFLUPREDNATE OPHTHALMIC 0.5 MG/ML
1 EMULSION OPHTHALMIC 4 TIMES DAILY
Start: 2023-05-01

## 2023-05-01 RX ORDER — LIDOCAINE HYDROCHLORIDE 20 MG/ML
INJECTION, SOLUTION INTRAVENOUS AS NEEDED
Status: DISCONTINUED | OUTPATIENT
Start: 2023-05-01 | End: 2023-05-01 | Stop reason: SURG

## 2023-05-01 RX ORDER — KETAMINE HYDROCHLORIDE 50 MG/ML
INJECTION, SOLUTION, CONCENTRATE INTRAMUSCULAR; INTRAVENOUS AS NEEDED
Status: DISCONTINUED | OUTPATIENT
Start: 2023-05-01 | End: 2023-05-01 | Stop reason: SURG

## 2023-05-01 RX ORDER — PROPOFOL 10 MG/ML
VIAL (ML) INTRAVENOUS AS NEEDED
Status: DISCONTINUED | OUTPATIENT
Start: 2023-05-01 | End: 2023-05-01 | Stop reason: SURG

## 2023-05-01 RX ORDER — SODIUM CHLORIDE 0.9 % (FLUSH) 0.9 %
10 SYRINGE (ML) INJECTION EVERY 12 HOURS SCHEDULED
Status: DISCONTINUED | OUTPATIENT
Start: 2023-05-01 | End: 2023-05-01 | Stop reason: HOSPADM

## 2023-05-01 RX ORDER — NEOMYCIN SULFATE, POLYMYXIN B SULFATE, AND DEXAMETHASONE 3.5; 10000; 1 MG/G; [USP'U]/G; MG/G
OINTMENT OPHTHALMIC AS NEEDED
Status: DISCONTINUED | OUTPATIENT
Start: 2023-05-01 | End: 2023-05-01 | Stop reason: HOSPADM

## 2023-05-01 RX ORDER — LIDOCAINE HYDROCHLORIDE 40 MG/ML
INJECTION, SOLUTION RETROBULBAR; TOPICAL AS NEEDED
Status: DISCONTINUED | OUTPATIENT
Start: 2023-05-01 | End: 2023-05-01 | Stop reason: HOSPADM

## 2023-05-01 RX ORDER — SODIUM CHLORIDE 9 MG/ML
40 INJECTION, SOLUTION INTRAVENOUS AS NEEDED
Status: DISCONTINUED | OUTPATIENT
Start: 2023-05-01 | End: 2023-05-01 | Stop reason: HOSPADM

## 2023-05-01 RX ORDER — PREDNISOLONE ACETATE 10 MG/ML
1 SUSPENSION/ DROPS OPHTHALMIC SEE ADMIN INSTRUCTIONS
Status: DISCONTINUED | OUTPATIENT
Start: 2023-05-01 | End: 2023-05-01 | Stop reason: HOSPADM

## 2023-05-01 RX ORDER — CYCLOPENT/TROPIC/PHEN/KETR/WAT 1%-1%-2.5%
1 DROPS (EA) OPHTHALMIC (EYE)
Status: COMPLETED | OUTPATIENT
Start: 2023-05-01 | End: 2023-05-01

## 2023-05-01 RX ORDER — TETRACAINE HYDROCHLORIDE 5 MG/ML
1 SOLUTION OPHTHALMIC SEE ADMIN INSTRUCTIONS
Status: COMPLETED | OUTPATIENT
Start: 2023-05-01 | End: 2023-05-01

## 2023-05-01 RX ORDER — SODIUM CHLORIDE, SODIUM LACTATE, POTASSIUM CHLORIDE, CALCIUM CHLORIDE 600; 310; 30; 20 MG/100ML; MG/100ML; MG/100ML; MG/100ML
1000 INJECTION, SOLUTION INTRAVENOUS CONTINUOUS
Status: DISCONTINUED | OUTPATIENT
Start: 2023-05-01 | End: 2023-05-01 | Stop reason: HOSPADM

## 2023-05-01 RX ORDER — MIDAZOLAM HYDROCHLORIDE 2 MG/2ML
INJECTION, SOLUTION INTRAMUSCULAR; INTRAVENOUS AS NEEDED
Status: DISCONTINUED | OUTPATIENT
Start: 2023-05-01 | End: 2023-05-01 | Stop reason: SURG

## 2023-05-01 RX ORDER — PREDNISOLONE ACETATE 10 MG/ML
SUSPENSION/ DROPS OPHTHALMIC AS NEEDED
Status: DISCONTINUED | OUTPATIENT
Start: 2023-05-01 | End: 2023-05-01 | Stop reason: HOSPADM

## 2023-05-01 RX ORDER — ACETAZOLAMIDE 500 MG/1
500 CAPSULE, EXTENDED RELEASE ORAL ONCE
Status: COMPLETED | OUTPATIENT
Start: 2023-05-01 | End: 2023-05-01

## 2023-05-01 RX ADMIN — Medication 1 DROP: at 12:20

## 2023-05-01 RX ADMIN — MIDAZOLAM HYDROCHLORIDE 2 MG: 1 INJECTION, SOLUTION INTRAMUSCULAR; INTRAVENOUS at 13:21

## 2023-05-01 RX ADMIN — SODIUM CHLORIDE, POTASSIUM CHLORIDE, SODIUM LACTATE AND CALCIUM CHLORIDE 1000 ML: 600; 310; 30; 20 INJECTION, SOLUTION INTRAVENOUS at 12:12

## 2023-05-01 RX ADMIN — PROPOFOL 20 MG: 10 INJECTION, EMULSION INTRAVENOUS at 13:26

## 2023-05-01 RX ADMIN — TETRACAINE HYDROCHLORIDE 1 DROP: 5 SOLUTION OPHTHALMIC at 12:09

## 2023-05-01 RX ADMIN — Medication 1 DROP: at 12:10

## 2023-05-01 RX ADMIN — PROPOFOL 20 MG: 10 INJECTION, EMULSION INTRAVENOUS at 13:24

## 2023-05-01 RX ADMIN — ACETAZOLAMIDE 500 MG: 500 CAPSULE, EXTENDED RELEASE ORAL at 13:58

## 2023-05-01 RX ADMIN — KETAMINE HYDROCHLORIDE 10 MG: 50 INJECTION, SOLUTION INTRAMUSCULAR; INTRAVENOUS at 13:21

## 2023-05-01 RX ADMIN — PROPOFOL 20 MG: 10 INJECTION, EMULSION INTRAVENOUS at 13:21

## 2023-05-01 RX ADMIN — TETRACAINE HYDROCHLORIDE 1 DROP: 5 SOLUTION OPHTHALMIC at 12:08

## 2023-05-01 RX ADMIN — Medication 1 DROP: at 12:15

## 2023-05-01 RX ADMIN — LIDOCAINE HYDROCHLORIDE 60 MG: 20 INJECTION, SOLUTION INTRAVENOUS at 13:21

## 2023-05-01 NOTE — DISCHARGE INSTRUCTIONS
Post Operative Cataract Instructions     Right Eye  POST OPERATIVE INSTRUCTIONS  You have received anesthesia today. DO NOT drive, drink alcohol, sign legal documents.   After surgery, your eye will not hurt. It may feel scratchy, sticky or uncomfortable. Your eye will be sensitive to light.  Most people see better 1-3 days after the procedure, but it could take 3 weeks to get the full benefits and reach your visual potential. If your vision is blurry for a few days it is normal, and means you may have swelling outside or inside the eye. For some patients, a bubble is placed and there will be blurriness.   You should receive a post-op kit with tape and an eye shield. Wear the shield for the first 3 nights after surgery to keep you from rubbing the eye.  You may wear glasses or sunglasses during the day.  You must keep eye protected at all times.  Most people are able to return to their normal routine 1-3 days after surgery, however, due to the brain adjusting to your new vision you may have trouble judging distances and want to be careful when driving and going up and downstairs.   You can shower and wash your hair the day after surgery. Keep water, shampoo, hair spray and shaving lotion out of the eye, especially for the first week.   If eyes become stuck together after surgery you may soak with warm cloth.  During the first week, you should AVOID:   Rubbing or putting pressure on your eye.  Eye make-up, face cream or lotion, hair coloring or perms  Strenuous activities, such as running or lifting weights, as to avoid sweat from getting in the eye. Avoid swimming, hot tubs, fumes or marty conditions.   Sleeping on operative side.  Excessive sneezing or coughing.  Hanging the head down for periods of time. Keep your head above your heart.    Some discomfort and blurred vision after surgery are normal, but if you have any unusual pain, swelling, bleeding or sudden decrease in vision, contact our office immediately.      Emergency assistance is available at any time by calling:    Dr.Mark Ashely Lund  [phone]    If unable to reach call [facility] @ [phone]    You have been prescribed an eye drop to use after surgery, please follow these instructions and bring eye drops and instructions with you to post op appointment:    Difluprednate (DUREZOL) 0.05 %. Shake well before use.    USE 1 DROP 4 (FOUR) TIMES A DAY FOR 1 WEEK THEN STARTING WEEK 2, ONLY USE 2 (TWO) TIMES A DAY UNTIL YOU RUN OUT OF DROPS.     PLACE A JOON IN THE DAY COLUMN EACH TIME YOU USE TO KEEP ON SCHEDULE    WEEK 1-USE 4  (FOUR) TIMES A DAY DAY 1  []   []    []   []     DAY 2  []   []    []   []  DAY 3  []   []    []   []  DAY 4  []   []    []   []  DAY 5  []   []    []   []  DAY 6  []   []    []   []  DAY 7  []   []    []   []      WEEK 2-USE 2 (TWO)  TIMES A DAY DAY 1  []   []  DAY 2  []   []  DAY 3  []   []  DAY 4  []   []  DAY 5  []   []  DAY 6  []   []  DAY 7  []   []      WEEK 3-USE 2 (TWO) TIMES A DAY DAY 1  []   []  DAY 2  []   []  DAY 3  []   []  DAY 4  []   []  DAY 5  []   []  DAY 6  []   []  DAY 7  []   []      WEEK 4-USE 2 (TWO)TIMES A DAY DAY 1  []   []  DAY 2  []   []  DAY 3  []   []  DAY 4  []   []  DAY 5  []   []  DAY 6  []   []  DAY 7  []   []

## 2023-05-01 NOTE — ANESTHESIA PREPROCEDURE EVALUATION
Anesthesia Evaluation     Patient summary reviewed and Nursing notes reviewed   NPO Solid Status: > 8 hours  NPO Liquid Status: > 6 hours           Airway   Mallampati: I  TM distance: >3 FB  Neck ROM: full  No difficulty expected  Dental - normal exam     Pulmonary - negative pulmonary ROS and normal exam   Cardiovascular - normal exam    (+) hyperlipidemia,       Neuro/Psych- negative ROS  GI/Hepatic/Renal/Endo    (+)  GERD,      Musculoskeletal     Abdominal  - normal exam    Bowel sounds: normal.   Substance History - negative use     OB/GYN negative ob/gyn ROS         Other   arthritis,                      Anesthesia Plan    ASA 3     MAC     intravenous induction     Anesthetic plan, risks, benefits, and alternatives have been provided, discussed and informed consent has been obtained with: patient.  Pre-procedure education provided      CODE STATUS:

## 2023-05-01 NOTE — ANESTHESIA POSTPROCEDURE EVALUATION
Patient: Ernestina Barnhart    Procedure Summary     Date: 05/01/23 Room / Location: [facility] OR 1 / [facility] OR    Anesthesia Start: 1319 Anesthesia Stop: 1336    Procedure: CATARACT PHACO EXTRACTION WITH INTRAOCULAR LENS IMPLANT RIGHT COMPLICATED WITH MALYUGIN RING (Right: Eye) Diagnosis:       Nuclear sclerotic cataract of right eye      (Nuclear sclerotic cataract of right eye [H25.11])    Surgeons: Leon Lund MD Provider: Jarek Bailey CRNA    Anesthesia Type: MAC ASA Status: 3          Anesthesia Type: MAC    Vitals  No vitals data found for the desired time range.          Post Anesthesia Care and Evaluation    Patient location during evaluation: bedside  Patient participation: complete - patient participated  Level of consciousness: awake and alert  Pain score: 0  Pain management: satisfactory to patient    Airway patency: patent  Anesthetic complications: No anesthetic complications  PONV Status: none  Cardiovascular status: acceptable and stable  Respiratory status: acceptable  Hydration status: acceptable    Comments: Vitals signs as noted in nursing documentation as per protocol.

## 2023-05-01 NOTE — H&P
[facility]         History and Physical    Patient Name: Ernestina Barnhart  MRN: [id number]  : 1950  Gender: female     HPI: Patient complaint of PPLOV Right eye diagnosed with cataract. Patient requests PHACO PCIOL for Increase of VA/ADL.    History:    Past Medical History:   Diagnosis Date   • Arthritis    • Elevated cholesterol    • GERD (gastroesophageal reflux disease)    • Reflux esophagitis        Past Surgical History:   Procedure Laterality Date   • CATARACT EXTRACTION W/ INTRAOCULAR LENS IMPLANT Left 2023    Procedure: CATARACT PHACO EXTRACTION WITH INTRAOCULAR LENS IMPLANT LEFT;  Surgeon: Leon Lund MD;  [facility];  Service: Ophthalmology;  Laterality: Left;   • CHOLECYSTECTOMY     • COLONOSCOPY     • HYSTERECTOMY     • HYSTEROSCOPY      SHIMON   • ROTATOR CUFF REPAIR      right   • SKIN CANCER EXCISION Right     shoulder       Social History     Socioeconomic History   • Marital status:    Tobacco Use   • Smoking status: Never   • Smokeless tobacco: Never   Vaping Use   • Vaping Use: Never used   Substance and Sexual Activity   • Alcohol use: No   • Drug use: No   • Sexual activity: Defer       History reviewed. No pertinent family history.    Prior to Admission Medications:  Medications Prior to Admission   Medication Sig Dispense Refill Last Dose   • aspirin 81 MG EC tablet Take 1 tablet by mouth Daily.   2023 at 0700   • atorvastatin (LIPITOR) 40 MG tablet Take 1 tablet by mouth Every Night.   2023 at 2130   • cetirizine (zyrTEC) 10 MG tablet Take 1 tablet by mouth Daily.   2023 at 2130   • cyclobenzaprine (FLEXERIL) 5 MG tablet Take 1 tablet by mouth 2 (Two) Times a Day As Needed.   2023 at 213   • estradiol (ESTRACE) 1 MG tablet Take 1 tablet by mouth Daily.   2023 at 2130   • fluticasone (FLONASE) 50 MCG/ACT nasal spray 1 spray into the nostril(s) as directed by provider 2 (Two) Times a Day.    4/30/2023 at 2130   • ipratropium (ATROVENT) 0.03 % nasal spray 1 spray Daily.   4/30/2023   • meclizine (ANTIVERT) 25 MG tablet Take 1 tablet by mouth 3 (Three) Times a Day As Needed.   Past Week   • methocarbamol (ROBAXIN) 500 MG tablet Take 1 tablet by mouth 4 (Four) Times a Day As Needed.   Past Week   • ondansetron (ZOFRAN) 8 MG tablet Take 1 tablet by mouth Every 8 (Eight) Hours As Needed.   Past Week   • pantoprazole (PROTONIX) 40 MG EC tablet 1 tablet Daily.   4/30/2023 at 0800   • sertraline (ZOLOFT) 25 MG tablet Take 1 tablet by mouth Daily.   4/30/2023 at 0800       Allergies:  Allergies   Allergen Reactions   • Demerol [Meperidine] Rash        Vitals: Temp:  [97.4 °F (36.3 °C)] 97.4 °F (36.3 °C)  Heart Rate:  [66] 66  Resp:  [18] 18  BP: (144)/(73) 144/73    Review of Systems:   Within Normal Limits Abnormal   HEENT [x]    []     Cardiovascular [x]   []     Gastrointestinal [x]   []     Genitourinary [x]   []     Neurologic [x]   []     Pulmonary [x]   []       Physical Exam:   Within Normal Limits Abnormal   HEENT [x]    []     Heart [x]   []     Lungs [x]   []     Abdomen [x]   []     Extremities [x]   []       Impression: Right nuclear sclerotic cataract.     Plan: CATARACT PHACO EXTRACTION WITH INTRAOCULAR LENS IMPLANT RIGHT (Right)     Leon Lund MD  5/1/2023

## 2023-05-01 NOTE — OP NOTE
OPERATIVE NOTE    Date of Procedure: 5/1/2023  Patient Name: Ernestina Barnhart  Patient MRN: [id number]  YOB: 1950     Preoperative Diagnosis: Right nuclear sclerotic cataract.     Postoperative Diagnosis: Right nuclear sclerotic cataract.     Procedure Performed: Phacoemulsification with implantation of a  foldable posterior chamber intraocular lens, Right eye.     Surgeon: Leon Lund MD     Anesthesia:  Monitored Anesthesia Care (MAC)      Brief History and Indication: The patient presents with a history of past progressive loss of vision.  Patient was diagnosed with cataract and requests removal for increased ability to read and see.     Operation Description: The patient was taken to the OR and prepped and draped in the usual sterile ophthalmic fashion. A lid speculum was placed in the eye.  A #75 blade was then used to make a stab incision two o’clock hours from the intended temporal clear cornea groove. The anterior chamber was then inflated with a Viscoelastic. A metal microkeratome blade was then used to enter the anterior chamber at the temporal clear cornea site. A three level tunnel incision was made. A curvilinear capsulorrhexis was then performed with a bent cystotome needle and capsulorrhexis forceps.  BSS on a 30 gauge bent cannula was used to hydro-dissect, and hydro-delineate the lens. Good fluid waves and hydro-delineation were noted. Phacoemulsification was then used to remove nuclear material without complications. The residual cortical and lenticular material was then removed with irrigation and aspiration. Viscoelastics were then used to inflate the bag in a soft shell technique. A PCIOL was injected into the bag. Post-implantation, there were no rents or tears in the bag and the lens was noted to be stable and centered. The residual Viscoelastic was then removed with irrigation and aspiration.  The wound was checked and found to be without leaks. Therefore a Polydex  ointment and one drop of Durezol eye drop was placed in the eye.     Implant Information:   Implant Name Type Inv. Item Serial No.  Lot No. LRB No. Used Action   LENS MONOFOCAL IQ [id number] - [id number] - [id number] Implant LENS MONOFOCAL IQ [id number] CLARISSA NA Right 1 Implanted       Complications: None    Estimated Blood Loss:  Less than 1 cc.       [x]   Changed to complex procedure due to: []  hypermature, white cataract. Blue dye was used. [x]   small iris. A Malyugin Ring was used.    Discharge and Condition  The patient was transported to same day surgery in excellent condition and scheduled for follow-up tomorrow morning. The patient was given instructions on use of eye drops for the operative eye and was specifically instructed to call Dr. Lund at his office or home for any nausea, vomiting, headache, decreased visual acuity, or pain, or if the patient had any general concerns.    Leon Lund MD  5/1/2023

## 2023-05-17 ENCOUNTER — TELEPHONE (OUTPATIENT)
Dept: ORTHOPEDIC SURGERY | Facility: CLINIC | Age: 73
End: 2023-05-17

## 2023-05-17 NOTE — TELEPHONE ENCOUNTER
Caller: Ernestina Barnhart    Relationship to patient: Self    Best call back number: [phone]/   [phone]    Chief complaint: LT SHOULDER      Type of visit: INJ    Requested date: Tuesday 05/23    Additional notes: PT HAS A FRIEND JIMMY TO SEE DR. FELDER 05/23 AND SHE WOULD LIKE TO COME WITH HER TO GET INJ IF POSSIBLE SAME DAY.

## 2023-05-30 NOTE — TELEPHONE ENCOUNTER
Caller: Ernestina Barnhart    Relationship to patient: Self    Best call back number: [phone]    Chief complaint: LEFT SHOULDER PAIN    Type of visit: INJECTION     Requested date: TODAY ONLY     Additional notes: PATIENT WAS CALLING TO SCHEDULE AN INJECTION WITH DR. FELDER FOR TODAY. PATIENT STATED IF SHE IS UNABLE TO GET IN TODAY THEN SHE WILL HAVE TO CALL BACK TO SCHEDULE AN APPT. THANK YOU!

## 2023-07-18 ENCOUNTER — OFFICE VISIT (OUTPATIENT)
Dept: ORTHOPEDIC SURGERY | Facility: CLINIC | Age: 73
End: 2023-07-18
Payer: MEDICARE

## 2023-07-18 VITALS
HEIGHT: 63 IN | BODY MASS INDEX: 28.63 KG/M2 | DIASTOLIC BLOOD PRESSURE: 82 MMHG | SYSTOLIC BLOOD PRESSURE: 144 MMHG | WEIGHT: 161.6 LBS

## 2023-07-18 DIAGNOSIS — M18.11 ARTHRITIS OF CARPOMETACARPAL (CMC) JOINT OF RIGHT THUMB: ICD-10-CM

## 2023-07-18 DIAGNOSIS — M25.539 PAIN OF RADIAL SIDE OF WRIST: Primary | ICD-10-CM

## 2023-07-18 DIAGNOSIS — M79.644 THUMB PAIN, RIGHT: ICD-10-CM

## 2023-07-18 RX ORDER — FLUCONAZOLE 100 MG/1
TABLET ORAL
COMMUNITY
Start: 2023-07-17

## 2023-07-18 RX ORDER — DULOXETIN HYDROCHLORIDE 60 MG/1
CAPSULE, DELAYED RELEASE ORAL
COMMUNITY
Start: 2023-07-07

## 2023-07-18 RX ORDER — AMOXICILLIN AND CLAVULANATE POTASSIUM 875; 125 MG/1; MG/1
TABLET, FILM COATED ORAL
COMMUNITY
Start: 2023-07-17

## 2023-07-18 RX ADMIN — LIDOCAINE HYDROCHLORIDE 0.5 ML: 10 INJECTION, SOLUTION EPIDURAL; INFILTRATION; INTRACAUDAL; PERINEURAL at 13:02

## 2023-07-18 RX ADMIN — TRIAMCINOLONE ACETONIDE 20 MG: 40 INJECTION, SUSPENSION INTRA-ARTICULAR; INTRAMUSCULAR at 13:02

## 2023-07-18 NOTE — PROGRESS NOTES
Procedure   - Small Joint Arthrocentesis: R thumb CMC on 7/18/2023 1:02 PM  Indications: pain  Details: 25 G (short) needle, radial approach  Medications: 0.5 mL lidocaine PF 1% 1 %; 20 mg triamcinolone acetonide 40 MG/ML  Outcome: tolerated well, no immediate complications  Procedure, treatment alternatives, risks and benefits explained, specific risks discussed. Consent was given by the patient. Immediately prior to procedure a time out was called to verify the correct patient, procedure, equipment, support staff and site/side marked as required. Patient was prepped and draped in the usual sterile fashion.

## 2023-07-18 NOTE — PROGRESS NOTES
[facility] Note    Subjective     Chief Complaint   Patient presents with    Right Hand - Pain        HPI  Ernestina Barnhart is a 72 y.o. female.  Right-hand-dominant.  Established patient presents for evaluation of right thumb/hand pain.  Symptoms/pain have been ongoing for over a year.      Pain scale: 8/10.  Severity of the pain moderate.  Quality of the pain aching, throbbing.  Associated symptoms swelling, popping.  Activity related to pain working, movement of thumb, ring and red, writing.  Pain eased by heat, medication.  No reported numbness or tingling.      Notes difficulty with gripping, grasping activities.    Denies fever, chills, night sweats or other constitutional symptoms.    Past Medical History:   Diagnosis Date    Arthritis     Elevated cholesterol     GERD (gastroesophageal reflux disease)     Reflux esophagitis       Past Surgical History:   Procedure Laterality Date    CATARACT EXTRACTION W/ INTRAOCULAR LENS IMPLANT Left 4/17/2023    Procedure: CATARACT PHACO EXTRACTION WITH INTRAOCULAR LENS IMPLANT LEFT;  Surgeon: Leon Lund MD;  Location: [facility] OR;  Service: Ophthalmology;  Laterality: Left;    CATARACT EXTRACTION W/ INTRAOCULAR LENS IMPLANT Right 5/1/2023    Procedure: CATARACT PHACO EXTRACTION WITH INTRAOCULAR LENS IMPLANT RIGHT COMPLICATED WITH MALYUGIN RING;  Surgeon: Leon Lund MD;  Location: [facility] OR;  Service: Ophthalmology;  Laterality: Right;    CHOLECYSTECTOMY      COLONOSCOPY      HYSTERECTOMY      HYSTEROSCOPY  1984    [facility]    ROTATOR CUFF REPAIR      right    SKIN CANCER EXCISION Right     shoulder      History reviewed. No pertinent family history.  Social History     Socioeconomic History    Marital status:    Tobacco Use    Smoking status: Never    Smokeless tobacco: Never   Vaping Use    Vaping Use: Never used   Substance and Sexual Activity    Alcohol use: No    Drug use: No    Sexual activity: Defer      Current  "Outpatient Medications on File Prior to Visit   Medication Sig Dispense Refill    amoxicillin-clavulanate (AUGMENTIN) 875-125 MG per tablet       aspirin 81 MG EC tablet Take 1 tablet by mouth Daily.      atorvastatin (LIPITOR) 40 MG tablet Take 1 tablet by mouth Every Night.      cetirizine (zyrTEC) 10 MG tablet Take 1 tablet by mouth Daily.      cyclobenzaprine (FLEXERIL) 5 MG tablet Take 1 tablet by mouth 2 (Two) Times a Day As Needed.      Diclofenac Sodium (VOLTAREN) 1 % gel gel       difluprednate (DUREZOL) 0.05 % ophthalmic emulsion Administer 1 drop to the right eye 4 (Four) Times a Day. See admin instructions on AVS.      DULoxetine (CYMBALTA) 60 MG capsule       estradiol (ESTRACE) 1 MG tablet Take 1 tablet by mouth Daily.      fluconazole (DIFLUCAN) 100 MG tablet       fluticasone (FLONASE) 50 MCG/ACT nasal spray 1 spray into the nostril(s) as directed by provider 2 (Two) Times a Day.      ipratropium (ATROVENT) 0.03 % nasal spray 1 spray Daily.      meclizine (ANTIVERT) 25 MG tablet Take 1 tablet by mouth 3 (Three) Times a Day As Needed.      methocarbamol (ROBAXIN) 500 MG tablet Take 1 tablet by mouth 4 (Four) Times a Day As Needed.      ondansetron (ZOFRAN) 8 MG tablet Take 1 tablet by mouth Every 8 (Eight) Hours As Needed.      pantoprazole (PROTONIX) 40 MG EC tablet 1 tablet Daily.      sertraline (ZOLOFT) 50 MG tablet        No current facility-administered medications on file prior to visit.      Allergies   Allergen Reactions    Demerol [Meperidine] Rash        The following portions of the patient's history were reviewed and updated as appropriate: allergies, current medications, past family history, past medical history, past social history, past surgical history, and problem list.    Review of Systems   Musculoskeletal:  Positive for arthralgias.   All other systems reviewed and are negative.     Objective      Physical Exam  /82   Ht 161 cm (63.39\")   Wt 73.3 kg (161 lb 9.6 oz)   BMI " 28.28 kg/m²     Body mass index is 28.28 kg/m².    GENERAL APPEARANCE: awake, alert & oriented x 3, in no acute distress and well developed, well nourished  PSYCH: normal mood and affect  LUNGS:  breathing nonlabored, no wheezing  EYES: sclera anicteric, pupils equal  CARDIOVASCULAR: palpable pulses. Capillary refill less than 2 seconds  INTEGUMENTARY: skin intact, no clubbing, cyanosis  NEUROLOGIC:  Normal Sensation         Ortho Exam  Peripheral Vascular   Bilateral Upper Extremity    No cyanotic nail beds    Pink nail beds and rapid capillary refill   Palpation    Radial Pulse - Bilaterally normal    Neurologic   Sensory: Light touch intact- Right hand      Right Upper Extremity    Right wrist extensors: 5/5    Right wrist flexors: 5/5    Right intrinsics: 5/5    Musculoskeletal     Inspection and Palpation   Right /Left Wrist      Tenderness -none    Swelling - none    Crepitus - none    Muscle tone - no atrophy     Functional Testing:     Right Wrist and Thumb    Finklestein's: Slight discomfort    CMC shuck: Negative    CMC grind: Positive    CMC tenderness: Positive    A1 pulley: Negative       Strength and Tone    Right  strength: good    Left  strength: good     Hand Exam/Etc: Able to make a loose composite fist.      Imaging/Studies  Ordered right hand plain films.  Imaging read/interpreted by Dr. Tierney.    Imaging Results (Last 7 Days)       Procedure Component Value Units Date/Time    XR Hand 3+ View Right [[id number]] Resulted: 07/18/23 1239     Updated: 07/18/23 1240    Narrative:      Right Hand X-Ray    Indication: Pain    Views:  AP, Lateral, and Oblique     Comparison: None    Findings:  No fracture  No bony lesion  Normal soft tissues  Moderate thumb CMC arthritis is noted.    Impression:   Moderate thumb CMC arthritis.  No acute bony abnormality.              Assessment/Plan        ICD-10-CM ICD-9-CM   1. Pain of radial side of wrist  M25.539 719.43   2. Thumb pain, right  M79.644  729.5   3. Arthritis of carpometacarpal (CMC) joint of right thumb  M18.11 716.94       Orders Placed This Encounter   Procedures    - Small Joint Arthrocentesis: R thumb CMC    XR Hand 3+ View Right        -Right radial wrist/thumb pain due to first CMC osteoarthritis.  -Reviewed imaging with the patient.  -Discussed the nature of this condition with the patient.  Risks and benefits of operative and nonoperative intervention were discussed.  After discussion, shared decision-making led to nonoperative treatment.  Patient understands that this can change, based on future evaluation and imaging.  -Offered and accepted right thumb first CMC corticosteroid injection. Injection given today.  -Provided CMC brace.  -Recommend OTC NSAIDs/pain medication as needed.  -Follow up as needed.  Did inform the patient that we will be getting to hand surgeons in October 2023.  -Questions and concerns answered.    After discussing the risks, benefits, indications of injection, the patient gave consent to proceed.  Right thumb, first CMC joint was confirmed as the correct site to be injected with a timeout.  It was then prepped using Hibiclens and injected with a mixture of 1/2 cc of 1% plain lidocaine and 1/2 cc of Kenalog (40 mg per mL), without any resistance through the dorsal radial approach, patient in seated position.  Area was cleaned, hemostasis was achieved and a Band-Aid was applied over the injection site.  The patient tolerated procedure well.  I instructed the patient on signs and symptoms of infection.  They should report to the emergency department or return to clinic if any of these develop, for further evaluation and treatment.  Recommended modifying activity for the next 48 hours to include rest, ice, elevation and oral pain medication as needed.        Medical Decision Making  Management Options : over-the-counter medicine and prescription/IM medicine  Data/Risk: radiology tests      Sara Waldron,  CANDACE  07/24/23  07:56 EDT               EMR Dragon/Transcription disclaimer:  Much of this encounter note is an electronic transcription of spoken language to printed text. Electronic transcription of spoken language may permit erroneous, or at times, nonsensical words or phrases to be inadvertently transcribed. Although I have reviewed the note for such errors, some may still exist.

## 2023-07-24 RX ORDER — TRIAMCINOLONE ACETONIDE 40 MG/ML
20 INJECTION, SUSPENSION INTRA-ARTICULAR; INTRAMUSCULAR
Status: COMPLETED | OUTPATIENT
Start: 2023-07-18 | End: 2023-07-18

## 2023-07-24 RX ORDER — LIDOCAINE HYDROCHLORIDE 10 MG/ML
0.5 INJECTION, SOLUTION EPIDURAL; INFILTRATION; INTRACAUDAL; PERINEURAL
Status: COMPLETED | OUTPATIENT
Start: 2023-07-18 | End: 2023-07-18

## 2023-10-11 ENCOUNTER — TELEPHONE (OUTPATIENT)
Dept: ORTHOPEDIC SURGERY | Facility: CLINIC | Age: 73
End: 2023-10-11

## 2023-10-11 NOTE — TELEPHONE ENCOUNTER
Caller: AKOSUA YOUSSEF    Relationship to patient: PATIENT     Best call back number: [phone]    Chief complaint: LEFT KNEE     Type of visit: KENALOG INJECTION     Requested date: NOT SURE, RECEIVED HARRY HIP INJECTION 10/10/23.  PATIENTS WANTS TO GET LEFT KNEE INJECTION - HOW LONG SHOULD SHE WAIT AFTER RECEIVING HARRY HIP INJECTION 10/10/23?     If rescheduling, when is the original appointment: 10/11/23     Additional notes: N/A

## 2023-10-18 ENCOUNTER — OFFICE VISIT (OUTPATIENT)
Dept: ORTHOPEDIC SURGERY | Facility: CLINIC | Age: 73
End: 2023-10-18
Payer: MEDICARE

## 2023-10-18 VITALS
DIASTOLIC BLOOD PRESSURE: 84 MMHG | WEIGHT: 159.8 LBS | HEIGHT: 63 IN | BODY MASS INDEX: 28.31 KG/M2 | SYSTOLIC BLOOD PRESSURE: 130 MMHG

## 2023-10-18 DIAGNOSIS — M17.12 PRIMARY OSTEOARTHRITIS OF LEFT KNEE: Primary | ICD-10-CM

## 2023-10-18 RX ORDER — METOCLOPRAMIDE 5 MG/1
TABLET ORAL
COMMUNITY
Start: 2023-10-04

## 2023-10-18 RX ORDER — MAGNESIUM OXIDE 400 MG/1
400 TABLET ORAL DAILY
COMMUNITY

## 2023-10-18 RX ORDER — FAMOTIDINE 40 MG/1
TABLET, FILM COATED ORAL
COMMUNITY
Start: 2023-09-11

## 2023-10-18 RX ORDER — CONJUGATED ESTROGENS 0.62 MG/G
CREAM VAGINAL
COMMUNITY
Start: 2023-09-11

## 2023-10-18 RX ORDER — ROPIVACAINE HYDROCHLORIDE 5 MG/ML
4 INJECTION, SOLUTION EPIDURAL; INFILTRATION; PERINEURAL
Status: COMPLETED | OUTPATIENT
Start: 2023-10-18 | End: 2023-10-18

## 2023-10-18 RX ORDER — TRIAMCINOLONE ACETONIDE 40 MG/ML
40 INJECTION, SUSPENSION INTRA-ARTICULAR; INTRAMUSCULAR
Status: COMPLETED | OUTPATIENT
Start: 2023-10-18 | End: 2023-10-18

## 2023-10-18 RX ADMIN — TRIAMCINOLONE ACETONIDE 40 MG: 40 INJECTION, SUSPENSION INTRA-ARTICULAR; INTRAMUSCULAR at 08:38

## 2023-10-18 RX ADMIN — ROPIVACAINE HYDROCHLORIDE 4 ML: 5 INJECTION, SOLUTION EPIDURAL; INFILTRATION; PERINEURAL at 08:38

## 2023-10-18 NOTE — PROGRESS NOTES
Procedure   - Large Joint Arthrocentesis: L knee on 10/18/2023 8:38 AM  Indications: pain  Details: 21 G needle, anterolateral approach  Medications: 4 mL ropivacaine 0.5 %; 40 mg triamcinolone acetonide 40 MG/ML  Outcome: tolerated well, no immediate complications  Procedure, treatment alternatives, risks and benefits explained, specific risks discussed. Consent was given by the patient. Immediately prior to procedure a time out was called to verify the correct patient, procedure, equipment, support staff and site/side marked as required. Patient was prepped and draped in the usual sterile fashion.

## 2023-10-18 NOTE — PROGRESS NOTES
[facility] [place] [facility] Note    Subjective     Chief Complaint   Patient presents with    Follow-up     3 month f/u; Primary osteoarthritis of left knee        HPI    It has been 3  month(s) since Ms. Barnhart's last visit. She returns to clinic today for follow-up of left knee arthritis. The issue has been ongoing for 1 year(s). She rates her pain a 7/10 on the pain scale. Previous/current treatments: NSAIDS and steroid injection (last injection 7/7/23). Current symptoms: pain, swelling, and popping. The pain is worse with sitting, climbing stairs, sleeping, lying on affected side, and rising from seated position; ice and pain medication and/or NSAID improve the pain. Overall, she is doing worse.  Injection 3 months ago lasted for about a month.    I have reviewed the following portions of the patient's history and agree with: History of Present Illness and Review of Systems    Patient Active Problem List   Diagnosis    Nontraumatic complete tear of right rotator cuff    Biceps tendinitis of right upper extremity    Impingement syndrome of right shoulder    Bursitis of right shoulder    Status post complete repair of rotator cuff    Rotator cuff injury, left, initial encounter    Biceps tendinitis of left upper extremity    Impingement syndrome of left shoulder    Left shoulder pain    Bursitis of left shoulder    Nontraumatic complete tear of left rotator cuff    Tenosynovitis, de Quervain    Right wrist pain    Nuclear sclerotic cataract of left eye     Past Medical History:   Diagnosis Date    Arthritis     Elevated cholesterol     GERD (gastroesophageal reflux disease)     Reflux esophagitis       Past Surgical History:   Procedure Laterality Date    CATARACT EXTRACTION W/ INTRAOCULAR LENS IMPLANT Left 4/17/2023    Procedure: CATARACT PHACO EXTRACTION WITH INTRAOCULAR LENS IMPLANT LEFT;  Surgeon: Leon Lund MD;  Location: [facility];  Service: Ophthalmology;  Laterality: Left;    CATARACT  EXTRACTION W/ INTRAOCULAR LENS IMPLANT Right 5/1/2023    Procedure: CATARACT PHACO EXTRACTION WITH INTRAOCULAR LENS IMPLANT RIGHT COMPLICATED WITH MALYUGIN RING;  Surgeon: Leon Lund MD;  Location: [facility];  Service: Ophthalmology;  Laterality: Right;    CHOLECYSTECTOMY      COLONOSCOPY      HYSTERECTOMY      HYSTEROSCOPY  1984    [facility]    ROTATOR CUFF REPAIR      right    SKIN CANCER EXCISION Right     shoulder      History reviewed. No pertinent family history.  Social History     Socioeconomic History    Marital status:    Tobacco Use    Smoking status: Never    Smokeless tobacco: Never   Vaping Use    Vaping Use: Never used   Substance and Sexual Activity    Alcohol use: No    Drug use: No    Sexual activity: Defer      Current Outpatient Medications on File Prior to Visit   Medication Sig Dispense Refill    amoxicillin-clavulanate (AUGMENTIN) 875-125 MG per tablet       aspirin 81 MG EC tablet Take 1 tablet by mouth Daily.      atorvastatin (LIPITOR) 40 MG tablet Take 1 tablet by mouth Every Night.      cetirizine (zyrTEC) 10 MG tablet Take 1 tablet by mouth Daily.      Diclofenac Sodium (VOLTAREN) 1 % gel gel       DULoxetine (CYMBALTA) 60 MG capsule       famotidine (PEPCID) 40 MG tablet       fluticasone (FLONASE) 50 MCG/ACT nasal spray 1 spray into the nostril(s) as directed by provider 2 (Two) Times a Day.      ipratropium (ATROVENT) 0.03 % nasal spray 1 spray Daily.      magnesium oxide (MAG-OX) 400 MG tablet Take 1 tablet by mouth Daily.      meclizine (ANTIVERT) 25 MG tablet Take 1 tablet by mouth 3 (Three) Times a Day As Needed.      metoclopramide (REGLAN) 5 MG tablet       Premarin 0.625 MG/GM vaginal cream       [DISCONTINUED] cyclobenzaprine (FLEXERIL) 5 MG tablet Take 1 tablet by mouth 2 (Two) Times a Day As Needed. (Patient not taking: Reported on 10/18/2023)      [DISCONTINUED] difluprednate (DUREZOL) 0.05 % ophthalmic emulsion Administer 1 drop to the right eye 4 (Four) Times a  Day. See admin instructions on AVS. (Patient not taking: Reported on 10/18/2023)      [DISCONTINUED] estradiol (ESTRACE) 1 MG tablet Take 1 tablet by mouth Daily. (Patient not taking: Reported on 10/18/2023)      [DISCONTINUED] fluconazole (DIFLUCAN) 100 MG tablet  (Patient not taking: Reported on 10/18/2023)      [DISCONTINUED] methocarbamol (ROBAXIN) 500 MG tablet Take 1 tablet by mouth 4 (Four) Times a Day As Needed. (Patient not taking: Reported on 10/18/2023)      [DISCONTINUED] ondansetron (ZOFRAN) 8 MG tablet Take 1 tablet by mouth Every 8 (Eight) Hours As Needed. (Patient not taking: Reported on 10/18/2023)      [DISCONTINUED] pantoprazole (PROTONIX) 40 MG EC tablet 1 tablet Daily. (Patient not taking: Reported on 10/18/2023)      [DISCONTINUED] sertraline (ZOLOFT) 50 MG tablet  (Patient not taking: Reported on 10/18/2023)       No current facility-administered medications on file prior to visit.      Allergies   Allergen Reactions    Demerol [Meperidine] Rash        Review of Systems   Constitutional:  Negative for activity change, appetite change, chills, diaphoresis, fatigue, fever and unexpected weight change.   HENT:  Negative for congestion, dental problem, drooling, ear discharge, ear pain, facial swelling, hearing loss, mouth sores, nosebleeds, postnasal drip, rhinorrhea, sinus pressure, sneezing, sore throat, tinnitus, trouble swallowing and voice change.    Eyes:  Negative for photophobia, pain, discharge, redness, itching and visual disturbance.   Respiratory:  Negative for apnea, cough, choking, chest tightness, shortness of breath, wheezing and stridor.    Cardiovascular:  Negative for chest pain, palpitations and leg swelling.   Gastrointestinal:  Negative for abdominal distention, abdominal pain, anal bleeding, blood in stool, constipation, diarrhea, nausea, rectal pain and vomiting.   Endocrine: Negative for cold intolerance, heat intolerance, polydipsia, polyphagia and polyuria.  "  Genitourinary:  Negative for decreased urine volume, difficulty urinating, dysuria, enuresis, flank pain, frequency, genital sores, hematuria and urgency.   Musculoskeletal:  Positive for arthralgias. Negative for back pain, gait problem, joint swelling, myalgias, neck pain and neck stiffness.   Skin:  Negative for color change, pallor, rash and wound.   Allergic/Immunologic: Negative for environmental allergies, food allergies and immunocompromised state.   Neurological:  Negative for dizziness, tremors, seizures, syncope, facial asymmetry, speech difficulty, weakness, light-headedness, numbness and headaches.   Hematological:  Negative for adenopathy. Does not bruise/bleed easily.   Psychiatric/Behavioral:  Negative for agitation, behavioral problems, confusion, decreased concentration, dysphoric mood, hallucinations, self-injury, sleep disturbance and suicidal ideas. The patient is not nervous/anxious and is not hyperactive.         Objective      Physical Exam  /84   Ht 161 cm (63.39\")   Wt 72.5 kg (159 lb 12.8 oz)   BMI 27.96 kg/m²     Body mass index is 27.96 kg/m².    General:   Mental Status:  Alert   Appearance: Cooperative, in no acute distress   Build and Nutrition: Well-nourished well-developed female   Orientation: Alert and oriented to person, place and time   Posture: Normal   Gait: Mild limp on the left    Integument:   Left knee: No skin lesions, no rash, no ecchymosis    Lower Extremities:   Left Knee:    Tenderness:  Medial/lateral joint line tenderness    Effusion:  1+    Swelling:  None    Crepitus: Soft    Range of motion:  Extension: 0°       Flexion: 120°  Instability: No varus laxity, no valgus laxity, negative anterior drawer  Deformities:  None      Imaging/Studies  Imaging Results (Last 24 Hours)       ** No results found for the last 24 hours. **          No new imaging today.    Assessment and Plan     Diagnoses and all orders for this visit:    1. Primary osteoarthritis of " left knee (Primary)  -     - Large Joint Arthrocentesis: L knee        1. Primary osteoarthritis of left knee        I reviewed my findings with the patient.  We discussed options again today, and she would like to proceed with a repeat injection.  I will see her back in 3 months, but sooner for any problems.    Procedure Note:  The potential benefits of performing a therapeutic left knee joint injection, as well as potential risks (including, but not limited to infection, swelling, pain, bleeding, bruising, nerve/blood vessel damage, skin color changes, transient elevation in blood glucose levels, and fat atrophy) were discussed with the patient.  After informed consent, timeout procedure was performed, and the skin on the left knee was prepped with chlorhexidine soap and alcohol, after which ethyl chloride was applied to the skin at the injection site. Via the anterolateral approach, 1ml of Kenalog 40mg/ml mixed with 4ml 0.5% ropivacaine plain was injected into the knee joint.  The patient tolerated the procedure well, experiencing 50% improvement a few minutes following the injection. There were no complications.  Band-Aid was applied to the injection site. Post-procedural instructions were given to the patient and/or their caregiver.      Return in about 3 months (around 1/18/2024).      Pito Ordoñez MD  10/18/23  08:49 EDT

## 2024-03-08 ENCOUNTER — OFFICE VISIT (OUTPATIENT)
Dept: ORTHOPEDIC SURGERY | Facility: CLINIC | Age: 74
End: 2024-03-08
Payer: MEDICARE

## 2024-03-08 VITALS
WEIGHT: 160.8 LBS | DIASTOLIC BLOOD PRESSURE: 86 MMHG | HEIGHT: 63 IN | SYSTOLIC BLOOD PRESSURE: 124 MMHG | BODY MASS INDEX: 28.49 KG/M2

## 2024-03-08 DIAGNOSIS — M75.122 NONTRAUMATIC COMPLETE TEAR OF LEFT ROTATOR CUFF: ICD-10-CM

## 2024-03-08 DIAGNOSIS — S46.002A ROTATOR CUFF INJURY, LEFT, INITIAL ENCOUNTER: Primary | ICD-10-CM

## 2024-03-08 RX ORDER — DICLOFENAC POTASSIUM 50 MG/1
50 TABLET, FILM COATED ORAL
COMMUNITY
Start: 2023-10-17

## 2024-03-08 RX ORDER — LIDOCAINE HYDROCHLORIDE 10 MG/ML
5 INJECTION, SOLUTION EPIDURAL; INFILTRATION; INTRACAUDAL; PERINEURAL
Status: COMPLETED | OUTPATIENT
Start: 2024-03-08 | End: 2024-03-08

## 2024-03-08 RX ORDER — TRIAMCINOLONE ACETONIDE 40 MG/ML
40 INJECTION, SUSPENSION INTRA-ARTICULAR; INTRAMUSCULAR
Status: COMPLETED | OUTPATIENT
Start: 2024-03-08 | End: 2024-03-08

## 2024-03-08 RX ORDER — FLUTICASONE PROPIONATE AND SALMETEROL 100; 50 UG/1; UG/1
1 POWDER RESPIRATORY (INHALATION)
COMMUNITY

## 2024-03-08 RX ORDER — DULOXETIN HYDROCHLORIDE 60 MG/1
60 CAPSULE, DELAYED RELEASE ORAL DAILY
COMMUNITY

## 2024-03-08 RX ORDER — DEXLANSOPRAZOLE 60 MG/1
60 CAPSULE, DELAYED RELEASE ORAL DAILY
COMMUNITY
Start: 2023-10-04

## 2024-03-08 RX ADMIN — TRIAMCINOLONE ACETONIDE 40 MG: 40 INJECTION, SUSPENSION INTRA-ARTICULAR; INTRAMUSCULAR at 10:10

## 2024-03-08 RX ADMIN — LIDOCAINE HYDROCHLORIDE 5 ML: 10 INJECTION, SOLUTION EPIDURAL; INFILTRATION; INTRACAUDAL; PERINEURAL at 10:10

## 2024-03-08 NOTE — PROGRESS NOTES
Procedure   - Large Joint Arthrocentesis: L subacromial bursa on 3/8/2024 10:10 AM  Indications: pain  Details: 21 G needle, posterior approach  Medications: 5 mL lidocaine PF 1% 1 %; 40 mg triamcinolone acetonide 40 MG/ML  Outcome: tolerated well, no immediate complications  Procedure, treatment alternatives, risks and benefits explained, specific risks discussed. Consent was given by the patient. Immediately prior to procedure a time out was called to verify the correct patient, procedure, equipment, support staff and site/side marked as required. Patient was prepped and draped in the usual sterile fashion.

## 2024-03-08 NOTE — PROGRESS NOTES
[facility] [place] [facility] Office Follow Up       Office Follow Up Visit       Patient Name: Ernestina Barnhart    Chief Complaint:   Chief Complaint   Patient presents with    Follow-up     9  month follow up -- Nontraumatic complete tear of right rotator cuff       Referring Physician: No ref. provider found    History of Present Illness:   It has been 9  month(s) since Ernestina Barnhart's last visit. Ernestina Barnhart returns to clinic today for F/U: follow-up of leftBody Part: shoulderReason: pain. The issue has been ongoing for 3 year(s). Ernestina Barnhart rates HIS/HER: herpain at 7/10 on the pain scale. Previous/current treatments: NSAIDS. Current symptoms:Symptoms: pain and swelling. The pain is worse with sleeping, working, lying on affected side, and any movement of the joint; resting and pain medication and/or NSAID improves the pain. Overall, he/she: sheis doing better. I have reviewed the patient's history of present illness as noted/entered above.    I have reviewed the patient's past medical history, surgical history, social history, family history, medications, and allergies as noted in the electronic medical record and as noted/entered.  I have reviewed the patient's review of systems as noted/enter and updated as noted in the patient's HPI.      Right shoulder chronic pain and dating up to 20 years.  Surgery with Dr. 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 Arroyo in 1997  Mechanism of Injury at Work: reaching up and injured  Current Work Status: retired  Treatments: w/c surgery     3/22/2022:   Chronic issues, but better since last visit  Injection 1/25/2022 - helped  Incredibly stoic        1/25/2022:  Right shoulder  I performed a peer to peer in the interim to get approval from Worker's Compensation for an MRI of the right shoulder which is now been completed.     Counseled and reviewed MRI counseled on operative versus nontreatment options     Prior:  I personally reviewed and interpreted an MRI within our system from 2019 indicating fairly significant supraspinatus full-thickness retracted tearing fairly medial type II tear, LHB intact     I personally interpreted x-rays of the biopsy system 12/15/2021.  Degenerative changes with calcific tendinitis but no significant superior migration despite the known history of full-thickness chronic rotator cuff tear        8/9/2022:  New diagnosis of left shoulder pain pain worsening over the last 3 months rating the pain is 8 out of 10.  Retired, no specific injury to the left shoulder.     9/13/2022:  LEFT SHOULDER  Full-thickness rotator cuff tear noted on her outside hospital MRI again this is for the left shoulder  Counseled on operative nonoperative measures.-She desires to avoid surgery     11/22/2022:  Left shoulder improved/better  Known history of full-thickness rotator cuff tearing.  Patient desires to avoid surgery.  Incredible clinically     1/27/2023:  LEFT SHOULDER --known chronic full-thickness tear, exacerbation and pain increased left shoulder        2/28/2023:  Right shoulder --right shoulder painful this is part of Worker's Compensation injury.  Quite a chronic injury.  She is responded to injections for these massive chronic rotator cuff tears on the right           6/27 2023:  Left shoulder known full-thickness  rotator cuff tearing desires to avoid surgery.  Desires repeat injection today       3/8/2024:  LEFT shoulder SA injection 3/8/2024 5+1      Subjective   Subjective      Review of Systems   Constitutional: Negative.  Negative for chills, fatigue and fever.   HENT: Negative.  Negative for congestion and dental problem.    Eyes: Negative.  Negative for blurred vision.   Respiratory: Negative.  Negative for shortness of breath.    Cardiovascular: Negative.  Negative for leg swelling.   Gastrointestinal: Negative.  Negative for abdominal pain.   Endocrine: Negative.  Negative for polyuria.   Genitourinary: Negative.  Negative for difficulty urinating.   Musculoskeletal:  Positive for arthralgias.   Skin: Negative.    Allergic/Immunologic: Negative.    Neurological: Negative.    Hematological: Negative.  Negative for adenopathy.   Psychiatric/Behavioral: Negative.  Negative for behavioral problems.         Past Medical History:   Past Medical History:   Diagnosis Date    Arthritis     Elevated cholesterol     GERD (gastroesophageal reflux disease)     Reflux esophagitis        Past Surgical History:   Past Surgical History:   Procedure Laterality Date    CATARACT EXTRACTION W/ INTRAOCULAR LENS IMPLANT Left 4/17/2023    Procedure: CATARACT PHACO EXTRACTION WITH INTRAOCULAR LENS IMPLANT LEFT;  Surgeon: Leon Lund MD;  Location: [facility];  Service: Ophthalmology;  Laterality: Left;    CATARACT EXTRACTION W/ INTRAOCULAR LENS IMPLANT Right 5/1/2023    Procedure: CATARACT PHACO EXTRACTION WITH INTRAOCULAR LENS IMPLANT RIGHT COMPLICATED WITH MALYUGIN RING;  Surgeon: Leon Lund MD;  Location: [facility];  Service: Ophthalmology;  Laterality: Right;    CHOLECYSTECTOMY      COLONOSCOPY      HYSTERECTOMY      HYSTEROSCOPY  1984    [facility]    ROTATOR CUFF REPAIR      right    SKIN CANCER EXCISION Right     shoulder       Family History: History reviewed. No pertinent family history.    Social History:   Social  "History     Socioeconomic History    Marital status:    Tobacco Use    Smoking status: Never    Smokeless tobacco: Never   Vaping Use    Vaping status: Never Used   Substance and Sexual Activity    Alcohol use: No    Drug use: No    Sexual activity: Defer       Medications:   Current Outpatient Medications:     amoxicillin-clavulanate (AUGMENTIN) 875-125 MG per tablet, , Disp: , Rfl:     aspirin 81 MG EC tablet, Take 1 tablet by mouth Daily., Disp: , Rfl:     atorvastatin (LIPITOR) 40 MG tablet, Take 1 tablet by mouth Every Night., Disp: , Rfl:     cetirizine (zyrTEC) 10 MG tablet, Take 1 tablet by mouth Daily., Disp: , Rfl:     dexlansoprazole (Dexilant) 60 MG capsule, Take 1 capsule by mouth Daily., Disp: , Rfl:     diclofenac (CATAFLAM) 50 MG tablet, Take 1 tablet by mouth., Disp: , Rfl:     DULoxetine (Cymbalta) 60 MG capsule, Take 1 capsule by mouth Daily., Disp: , Rfl:     fluticasone (FLONASE) 50 MCG/ACT nasal spray, 1 spray into the nostril(s) as directed by provider 2 (Two) Times a Day., Disp: , Rfl:     Fluticasone-Salmeterol (Advair Diskus) 100-50 MCG/ACT DISKUS, Inhale 1 puff., Disp: , Rfl:     ipratropium (ATROVENT) 0.03 % nasal spray, 1 spray Daily., Disp: , Rfl:     Magnesium Gluconate (MAGNESIUM 27 PO), Take 500 mg by mouth., Disp: , Rfl:     meclizine (ANTIVERT) 25 MG tablet, Take 1 tablet by mouth 3 (Three) Times a Day As Needed., Disp: , Rfl:     metoclopramide (REGLAN) 5 MG tablet, , Disp: , Rfl:     Premarin 0.625 MG/GM vaginal cream, , Disp: , Rfl:     Allergies:   Allergies   Allergen Reactions    Meperidine Rash       The following portions of the patient's history were reviewed and updated as appropriate: allergies, current medications, past family history, past medical history, past social history, past surgical history and problem list.        Objective    Objective      Vital Signs:   Vitals:    03/08/24 0944   BP: 124/86   Weight: 72.9 kg (160 lb 12.8 oz)   Height: 161 cm (63.39\") "       Ortho Exam:  LEFT SHOULDER    Results Review:  Imaging Results (Last 24 Hours)       ** No results found for the last 24 hours. **                Procedures    LEFT SHOULDER SUBACROMIAL SPACE INJECTION: Risks and benefits of a shoulder subacromial space injection were discussed and the patient desired to proceed. Verbal consent was obtained. The patient understood the risk of infection, potential skin changes, bump in blood glucose especially with diabetes, nerve injury, possibility of increased pain in the short term, and possible incomplete pain relief.  Using sterile technique, the shoulder subacromial space was injected from a posterior approach with 1mL of 40 mg triamcinolone acetonide 40 MG/ML and 5cc of lidocaine with aspiration prior to injection. The patient tolerated the procedure without difficulty.  CPT CODE 21922 for major joint aspiration/injection          Assessment / Plan      Assessment/Plan:   Problem List Items Addressed This Visit          Musculoskeletal and Injuries    Nontraumatic complete tear of left rotator cuff       Other    Rotator cuff injury, left, initial encounter - Primary       LEFT shoulder SA injection 3/8/2024 5+1    Follow Up: she desires right SA injection in 3-4 weeks      Jose Ochoa MD, FAAOS  Orthopedic Surgeon  Fellowship Trained Shoulder and Elbow Surgeon  [facility]  [address] [place] [address], [address]  [place] [address]    03/08/24  10:22 EST

## 2024-03-20 ENCOUNTER — OFFICE VISIT (OUTPATIENT)
Dept: ORTHOPEDIC SURGERY | Facility: CLINIC | Age: 74
End: 2024-03-20
Payer: MEDICARE

## 2024-03-20 VITALS
BODY MASS INDEX: 28.35 KG/M2 | SYSTOLIC BLOOD PRESSURE: 122 MMHG | DIASTOLIC BLOOD PRESSURE: 78 MMHG | WEIGHT: 160 LBS | HEIGHT: 63 IN

## 2024-03-20 DIAGNOSIS — M18.11 ARTHRITIS OF CARPOMETACARPAL (CMC) JOINT OF RIGHT THUMB: Primary | ICD-10-CM

## 2024-03-20 RX ORDER — TRIAMCINOLONE ACETONIDE 40 MG/ML
20 INJECTION, SUSPENSION INTRA-ARTICULAR; INTRAMUSCULAR
Status: COMPLETED | OUTPATIENT
Start: 2024-03-20 | End: 2024-03-20

## 2024-03-20 RX ORDER — LIDOCAINE HYDROCHLORIDE 10 MG/ML
0.5 INJECTION, SOLUTION EPIDURAL; INFILTRATION; INTRACAUDAL; PERINEURAL
Status: COMPLETED | OUTPATIENT
Start: 2024-03-20 | End: 2024-03-20

## 2024-03-20 RX ADMIN — TRIAMCINOLONE ACETONIDE 20 MG: 40 INJECTION, SUSPENSION INTRA-ARTICULAR; INTRAMUSCULAR at 11:01

## 2024-03-20 RX ADMIN — LIDOCAINE HYDROCHLORIDE 0.5 ML: 10 INJECTION, SOLUTION EPIDURAL; INFILTRATION; INTRACAUDAL; PERINEURAL at 11:01

## 2024-03-20 NOTE — PROGRESS NOTES
[facility] Orthopedic     Office Visit       Date: 03/20/2024   Patient Name: Ernestina Barnhart  MRN: [id number]  YOB: 1950    Referring Physician: No ref. provider found     Chief Complaint:   Chief Complaint   Patient presents with   • Right Hand - Pain       History of Present Illness:   Ernestina Barnhart is a 73 y.o. female  RHD who presents w/ right thumb pain of 1 year duration.  She report associated weakness with difficulty performing daily activities such as opening jars. The patient denies associated numbness or tingling. Patient reports that they have  used splints with minimal improvement in pain.  The patient has  received a steroid injection for their thumb pain in the past with temporary improvement of pain.     No other medical history.  Is retired.  Denies smoking.         Subjective   Review of Systems:   Review of Systems   Constitutional: Negative.  Negative for chills, fatigue and fever.   HENT: Negative.  Negative for congestion and dental problem.    Eyes: Negative.  Negative for blurred vision.   Respiratory: Negative.  Negative for shortness of breath.    Cardiovascular: Negative.  Negative for leg swelling.   Gastrointestinal: Negative.  Negative for abdominal pain.   Endocrine: Negative.  Negative for polyuria.   Genitourinary: Negative.  Negative for difficulty urinating.   Musculoskeletal:  Positive for arthralgias.   Skin: Negative.    Allergic/Immunologic: Negative.    Neurological: Negative.    Hematological: Negative.  Negative for adenopathy.   Psychiatric/Behavioral: Negative.  Negative for behavioral problems.         Pertinent review of systems per HPI.     I reviewed the patient's chief complaint, history of present illness, review of systems, past medical history, surgical history, family history, social history, medications and allergy list in the EMR on 03/20/2024 and agree with the findings  "above.    Objective    Vital Signs:   Vitals:    03/20/24 1041   BP: 122/78   Weight: 72.6 kg (160 lb)   Height: 161 cm (63.39\")     BMI: BMI is >= 25 and <30. (Overweight) The following options were offered after discussion;: weight loss educational material (shared in after visit summary)       General Appearance: No acute distress. Alert and oriented.     Chest:  Non-labored breathing on room air. Regular rate and rhythm.    Right Upper Extremity Exam:    No palpable masses or visible lesions  Fingers are warm, well-perfused with appropriate capillary refill.  Palpable radial pulse.    Sensation intact to light touch in median, radial and ulnar nerve distributions.    Motor- Fires FPL, ulnar intrinsics, EPL/EDC w/ full active and passive range of motion. Strength intact.    Non-tender except for in the areas highlighted below    Thumb Provocative Maneuvers:    Finklestein's:  negative   CMC shuck:  positive   CMC grind:  negative   CMC tenderness: positive   A1 pulley:  nontender      Imaging/Studies:   Imaging Results (Last 24 Hours)       ** No results found for the last 24 hours. **            X-ray of the right hand from July 2023 was independently reviewed and interpreted by myself and demonstrates evidence of moderate right thumb CMC arthritis.    Procedures:  Procedures    Quality Measures:   ACP:   ACP discussion was declined by the patient, Patient does not have an advance directive, declines further assistance.    Tobacco:   Ernestina Barnhart  reports that she has never smoked. She has been exposed to tobacco smoke. She has never used smokeless tobacco.      Assessment / Plan    Assessment/Plan:     There are no diagnoses linked to this encounter.     Ernestina Marcumsis a 73 y.o. female who presents with:      ICD-10-CM ICD-9-CM   1. Arthritis of carpometacarpal (CMC) joint of right thumb  M18.11 716.94         I reviewed the pathophysiology of the patient's diagnosis and the various treatment options " including conservative management with hand therapy, custom orthotics, corticosteroid injection and a neoprene brace as well as surgical treatment including CMC arthroplasty with trapeziectomy.  The patient has tried conservative management including bracing and steroid injection with only some improvement in pain and weakness.  We will continue with conservative management with a multimodal treatment including hand therapy, bracing and corticosteroid injections.  -Will refer to hand therapy for activity modifications and custom CMC splint.  -Recommend comfort cool thumb CMC splint if not able to tolerate custom splint.    Procedure Note:    I discussed with the patient the potential benefits of performing therapeutic aspiration and injections as well as potential risks including but not limited to infection, swelling, pain, bleeding, bruising, nerve/vessel damage, skin color changes, transient elevation in blood glucose levels, and fat atrophy. After informed consent and after the areas were prepped with chlorhexadine soap, ethyl chloride was used to numb the skin. Via the dorsal approach, 0.5 mL of 1% lidocaine was injected followed by injection of 20mg of Kenalog into the right thumb CMC joint.  The patient tolerated the procedure well. There were no complications. A sterile dressing was placed over the injection sites.      Follow Up:   Return in about 6 weeks (around 5/1/2024).        Estevan Beard MD  [facility] [place] Hand and Upper Extremity Surgeon

## 2024-03-20 NOTE — PROGRESS NOTES
Procedure   - Small Joint Arthrocentesis: R thumb CMC on 3/20/2024 11:01 AM  Indications: pain  Details: 25 G needle, volar approach  Medications: 0.5 mL lidocaine PF 1% 1 %; 20 mg triamcinolone acetonide 40 MG/ML  Outcome: tolerated well, no immediate complications  Procedure, treatment alternatives, risks and benefits explained, specific risks discussed. Consent was given by the patient. Immediately prior to procedure a time out was called to verify the correct patient, procedure, equipment, support staff and site/side marked as required. Patient was prepped and draped in the usual sterile fashion.

## 2024-03-29 ENCOUNTER — OFFICE VISIT (OUTPATIENT)
Dept: ORTHOPEDIC SURGERY | Facility: CLINIC | Age: 74
End: 2024-03-29
Payer: OTHER MISCELLANEOUS

## 2024-03-29 VITALS
DIASTOLIC BLOOD PRESSURE: 80 MMHG | HEIGHT: 63 IN | WEIGHT: 160.05 LBS | BODY MASS INDEX: 28.36 KG/M2 | SYSTOLIC BLOOD PRESSURE: 124 MMHG

## 2024-03-29 DIAGNOSIS — M75.121 NONTRAUMATIC COMPLETE TEAR OF RIGHT ROTATOR CUFF: Primary | ICD-10-CM

## 2024-03-29 RX ORDER — FAMOTIDINE 40 MG/1
40 TABLET, FILM COATED ORAL
COMMUNITY
Start: 2024-03-25

## 2024-03-29 RX ORDER — LIDOCAINE HYDROCHLORIDE 10 MG/ML
5 INJECTION, SOLUTION EPIDURAL; INFILTRATION; INTRACAUDAL; PERINEURAL
Status: COMPLETED | OUTPATIENT
Start: 2024-03-29 | End: 2024-03-29

## 2024-03-29 RX ORDER — TRIAMCINOLONE ACETONIDE 40 MG/ML
40 INJECTION, SUSPENSION INTRA-ARTICULAR; INTRAMUSCULAR
Status: COMPLETED | OUTPATIENT
Start: 2024-03-29 | End: 2024-03-29

## 2024-03-29 RX ADMIN — TRIAMCINOLONE ACETONIDE 40 MG: 40 INJECTION, SUSPENSION INTRA-ARTICULAR; INTRAMUSCULAR at 10:23

## 2024-03-29 RX ADMIN — LIDOCAINE HYDROCHLORIDE 5 ML: 10 INJECTION, SOLUTION EPIDURAL; INFILTRATION; INTRACAUDAL; PERINEURAL at 10:23

## 2024-03-29 NOTE — PROGRESS NOTES
[facility] Follow Up       Office Follow Up Visit       Patient Name: Ernestina Barnhart    Chief Complaint:   Chief Complaint   Patient presents with    Follow-up     3 week follow-up: Nontraumatic complete tear of right rotator cuff       Referring Physician: No ref. provider found    History of Present Illness:   It has been 3  week(s) since Ernestina Barnhart's last visit. Ernestina Barnhart returns to clinic today for F/U: follow-up of rightBody Part: shoulderReason: pain. The issue has been ongoing for many years. Ernestina Barnhart rates HIS/HER: herpain at 8/10 on the pain scale. Previous/current treatments: NSAIDS and steroid injection (last injection- left shoulder 3/8/24). Current symptoms:Symptoms: pain and stiffness. The pain is worse with sleeping, working, and lying on affected side; ice, heat, and pain medication and/or NSAID improves the pain. Overall, he/she: sheis doing better. I have reviewed the patient's history of present illness as noted/entered above.    I have reviewed the patient's past medical history, surgical history, social history, family history, medications, and allergies as noted in the electronic medical record and as noted/entered.  I have reviewed the patient's review of systems as noted/enter and updated as noted in the patient's HPI.      Right shoulder chronic pain and dating up to 20 years.  Surgery with Dr. 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 Arroyo in 1997  Mechanism of Injury at Work: reaching up and injured  Current Work Status: retired  Treatments: w/c surgery     3/22/2022:   Chronic issues, but better since last visit  Injection 1/25/2022 - helped  Incredibly stoic        1/25/2022:  Right shoulder  I performed a peer to peer in the interim to get approval from Worker's Compensation for an MRI of the right shoulder which is now been completed.     Counseled and reviewed MRI counseled on operative versus nontreatment options     Prior:  I personally reviewed and interpreted an MRI within our system from 2019 indicating fairly significant supraspinatus full-thickness retracted tearing fairly medial type II tear, LHB intact     I personally interpreted x-rays of the biopsy system 12/15/2021.  Degenerative changes with calcific tendinitis but no significant superior migration despite the known history of full-thickness chronic rotator cuff tear        8/9/2022:  New diagnosis of left shoulder pain pain worsening over the last 3 months rating the pain is 8 out of 10.  Retired, no specific injury to the left shoulder.     9/13/2022:  LEFT SHOULDER  Full-thickness rotator cuff tear noted on her outside hospital MRI again this is for the left shoulder  Counseled on operative nonoperative measures.-She desires to avoid surgery     11/22/2022:  Left shoulder improved/better  Known history of full-thickness rotator cuff tearing.  Patient desires to avoid surgery.  Incredible clinically     1/27/2023:  LEFT SHOULDER --known chronic full-thickness tear, exacerbation and pain increased left shoulder        2/28/2023:  Right shoulder --right shoulder painful this is part of Worker's Compensation injury.  Quite a chronic injury.  She is responded to injections for these massive chronic rotator cuff tears on the right           6/27 2023:  Left  shoulder known full-thickness rotator cuff tearing desires to avoid surgery.  Desires repeat injection today        3/8/2024:  LEFT shoulder SA injection 3/8/2024 5+1      3/29/2024:  RIGHT SA injection 3/29/2024  Mrs. 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  Negative for chills, fatigue and fever.   HENT: Negative.  Negative for congestion and dental problem.    Eyes: Negative.  Negative for blurred vision.   Respiratory: Negative.  Negative for shortness of breath.    Cardiovascular: Negative.  Negative for leg swelling.   Gastrointestinal: Negative.  Negative for abdominal pain.   Endocrine: Negative.  Negative for polyuria.   Genitourinary: Negative.  Negative for difficulty urinating.   Musculoskeletal:  Positive for arthralgias.   Skin: Negative.    Allergic/Immunologic: Negative.    Neurological: Negative.    Hematological: Negative.  Negative for adenopathy.   Psychiatric/Behavioral: Negative.  Negative for behavioral problems.         Past Medical History:   Past Medical History:   Diagnosis Date    Arthritis     Elevated cholesterol     GERD (gastroesophageal reflux disease)     Reflux esophagitis        Past Surgical History:   Past Surgical History:   Procedure Laterality Date    CATARACT EXTRACTION W/ INTRAOCULAR LENS IMPLANT Left 4/17/2023    Procedure: CATARACT PHACO EXTRACTION WITH INTRAOCULAR LENS IMPLANT LEFT;  Surgeon: Leon Lund MD;  Location: [facility];  Service: Ophthalmology;  Laterality: Left;    CATARACT EXTRACTION W/ INTRAOCULAR LENS IMPLANT Right 5/1/2023    Procedure: CATARACT PHACO EXTRACTION WITH INTRAOCULAR LENS IMPLANT RIGHT COMPLICATED WITH MALYUGIN RING;  Surgeon: Leon Lund MD;  Location: [facility];  Service: Ophthalmology;  Laterality: Right;    CHOLECYSTECTOMY      COLONOSCOPY      HYSTERECTOMY      HYSTEROSCOPY  1984     SHIMON    ROTATOR CUFF REPAIR      right    SKIN CANCER EXCISION Right     shoulder       Family History: History reviewed. No pertinent family history.    Social History:   Social History     Socioeconomic History    Marital status:    Tobacco Use    Smoking status: Never     Passive exposure: Past    Smokeless tobacco: Never   Vaping Use    Vaping status: Never Used   Substance and Sexual Activity    Alcohol use: No    Drug use: No    Sexual activity: Defer       Medications:   Current Outpatient Medications:     atorvastatin (LIPITOR) 40 MG tablet, Take 1 tablet by mouth Every Night., Disp: , Rfl:     cetirizine (zyrTEC) 10 MG tablet, Take 1 tablet by mouth Daily., Disp: , Rfl:     diclofenac (CATAFLAM) 50 MG tablet, Take 1 tablet by mouth., Disp: , Rfl:     DULoxetine (Cymbalta) 60 MG capsule, Take 1 capsule by mouth Daily., Disp: , Rfl:     famotidine (PEPCID) 40 MG tablet, 1 tablet., Disp: , Rfl:     fluticasone (FLONASE) 50 MCG/ACT nasal spray, 1 spray into the nostril(s) as directed by provider 2 (Two) Times a Day., Disp: , Rfl:     Fluticasone-Salmeterol (Advair Diskus) 100-50 MCG/ACT DISKUS, Inhale 1 puff., Disp: , Rfl:     ipratropium (ATROVENT) 0.03 % nasal spray, 1 spray Daily., Disp: , Rfl:     Magnesium Gluconate (MAGNESIUM 27 PO), Take 500 mg by mouth., Disp: , Rfl:     meclizine (ANTIVERT) 25 MG tablet, Take 1 tablet by mouth 3 (Three) Times a Day As Needed., Disp: , Rfl:     metoclopramide (REGLAN) 5 MG tablet, , Disp: , Rfl:     Premarin 0.625 MG/GM vaginal cream, , Disp: , Rfl:     Allergies:   Allergies   Allergen Reactions    Meperidine Rash       The following portions of the patient's history were reviewed and updated as appropriate: allergies, current medications, past family history, past medical history, past social history, past surgical history and problem list.        Objective    Objective      Vital Signs:   Vitals:    03/29/24 1015   BP: 124/80   Weight: 72.6 kg (160 lb 0.9 oz)  "  Height: 161 cm (63.39\")       Ortho Exam:  RIGHT SHOULDER pain    Results Review:  Imaging Results (Last 24 Hours)       ** No results found for the last 24 hours. **              Procedures    RIGHT SA injection 3/29/2024        Assessment / Plan      Assessment/Plan:   Problem List Items Addressed This Visit          Musculoskeletal and Injuries    Nontraumatic complete tear of right rotator cuff - Primary       RIGHT SA injection 3/29/2024    Follow Up: Follow-up as needed.  The patient will keep me updated pending any changes.        Jose Ochoa MD, FAAOS  Orthopedic Surgeon  Fellowship Trained Shoulder and Elbow Surgeon  [facility]  Orthopedics and Sports Medicine  [address] [place] [address], [address]  [place] [address]    03/29/24  10:38 EDT  "

## 2024-03-29 NOTE — PROGRESS NOTES
Procedure   - Large Joint Arthrocentesis: R subacromial bursa on 3/29/2024 10:23 AM  Indications: pain  Details: 21 G needle, posterior approach  Medications: 5 mL lidocaine PF 1% 1 %; 40 mg triamcinolone acetonide 40 MG/ML  Outcome: tolerated well, no immediate complications  Procedure, treatment alternatives, risks and benefits explained, specific risks discussed. Consent was given by the patient. Immediately prior to procedure a time out was called to verify the correct patient, procedure, equipment, support staff and site/side marked as required. Patient was prepped and draped in the usual sterile fashion.

## 2024-07-12 ENCOUNTER — OFFICE VISIT (OUTPATIENT)
Age: 74
End: 2024-07-12
Payer: MEDICARE

## 2024-07-12 VITALS
SYSTOLIC BLOOD PRESSURE: 122 MMHG | DIASTOLIC BLOOD PRESSURE: 86 MMHG | HEIGHT: 63 IN | BODY MASS INDEX: 27.29 KG/M2 | WEIGHT: 154 LBS

## 2024-07-12 DIAGNOSIS — M17.11 PRIMARY OSTEOARTHRITIS OF RIGHT KNEE: Primary | ICD-10-CM

## 2024-07-12 RX ORDER — ROPIVACAINE HYDROCHLORIDE 5 MG/ML
4 INJECTION, SOLUTION EPIDURAL; INFILTRATION; PERINEURAL
Status: COMPLETED | OUTPATIENT
Start: 2024-07-12 | End: 2024-07-12

## 2024-07-12 RX ORDER — TRIAMCINOLONE ACETONIDE 40 MG/ML
40 INJECTION, SUSPENSION INTRA-ARTICULAR; INTRAMUSCULAR
Status: COMPLETED | OUTPATIENT
Start: 2024-07-12 | End: 2024-07-12

## 2024-07-12 RX ADMIN — ROPIVACAINE HYDROCHLORIDE 4 ML: 5 INJECTION, SOLUTION EPIDURAL; INFILTRATION; PERINEURAL at 09:10

## 2024-07-12 RX ADMIN — TRIAMCINOLONE ACETONIDE 40 MG: 40 INJECTION, SUSPENSION INTRA-ARTICULAR; INTRAMUSCULAR at 09:10

## 2024-07-12 NOTE — PROGRESS NOTES
Procedure   - Large Joint Arthrocentesis: R knee on 7/12/2024 9:10 AM  Indications: pain  Details: 21 G needle, anterolateral approach  Medications: 4 mL ropivacaine 0.5 %; 40 mg triamcinolone acetonide 40 MG/ML  Outcome: tolerated well, no immediate complications  Procedure, treatment alternatives, risks and benefits explained, specific risks discussed. Consent was given by the patient. Immediately prior to procedure a time out was called to verify the correct patient, procedure, equipment, support staff and site/side marked as required. Patient was prepped and draped in the usual sterile fashion.

## 2024-07-12 NOTE — PROGRESS NOTES
[facility] [place] Orthopaedic Surgery Clinic Note    Subjective     Chief Complaint   Patient presents with    Right Knee - Follow-up     2 year follow up; Chronic pain of right knee        HPI    It has been 2  year(s) since Ms. Barnhart's last visit. She returns to clinic today for follow-up of right knee pain. The issue has been ongoing for 3+ year(s). She rates her pain a 8/10 on the pain scale. Previous/current treatments: NSAIDS, viscosupplementation (last injection 04/2021 ), and steroid injection (last injection 4/2022). Current symptoms: pain, swelling, and popping. The pain is worse with walking, standing, sleeping, lying on affected side, and rising from seated position; heat improve the pain. Overall, she is doing worse.  She would like a repeat injection today.  She is not interested in surgical intervention at this time.  She is also dealing with some back issues, and likely has some back surgery coming up in the near future she was telling me about today.    I have reviewed the following portions of the patient's history and agree with: History of Present Illness and Review of Systems    Patient Active Problem List   Diagnosis    Nontraumatic complete tear of right rotator cuff    Biceps tendinitis of right upper extremity    Impingement syndrome of right shoulder    Bursitis of right shoulder    Status post complete repair of rotator cuff    Rotator cuff injury, left, initial encounter    Biceps tendinitis of left upper extremity    Impingement syndrome of left shoulder    Left shoulder pain    Bursitis of left shoulder    Nontraumatic complete tear of left rotator cuff    Tenosynovitis, de Quervain    Right wrist pain    Nuclear sclerotic cataract of left eye     Past Medical History:   Diagnosis Date    Arthritis     Elevated cholesterol     GERD (gastroesophageal reflux disease)     Reflux esophagitis       Past Surgical History:   Procedure Laterality Date    CATARACT EXTRACTION W/ INTRAOCULAR LENS IMPLANT  Left 4/17/2023    Procedure: CATARACT PHACO EXTRACTION WITH INTRAOCULAR LENS IMPLANT LEFT;  Surgeon: Leon Lund MD;  Location: [facility] OR;  Service: Ophthalmology;  Laterality: Left;    CATARACT EXTRACTION W/ INTRAOCULAR LENS IMPLANT Right 5/1/2023    Procedure: CATARACT PHACO EXTRACTION WITH INTRAOCULAR LENS IMPLANT RIGHT COMPLICATED WITH MALYUGIN RING;  Surgeon: Leon Lund MD;  Location: [facility] OR;  Service: Ophthalmology;  Laterality: Right;    CHOLECYSTECTOMY      COLONOSCOPY      HYSTERECTOMY      HYSTEROSCOPY  1984    SHIMON    ROTATOR CUFF REPAIR      right    SKIN CANCER EXCISION Right     shoulder      History reviewed. No pertinent family history.  Social History     Socioeconomic History    Marital status:    Tobacco Use    Smoking status: Never     Passive exposure: Past    Smokeless tobacco: Never   Vaping Use    Vaping status: Never Used   Substance and Sexual Activity    Alcohol use: No    Drug use: No    Sexual activity: Defer      Current Outpatient Medications on File Prior to Visit   Medication Sig Dispense Refill    atorvastatin (LIPITOR) 40 MG tablet Take 1 tablet by mouth Every Night.      cetirizine (zyrTEC) 10 MG tablet Take 1 tablet by mouth Daily.      DULoxetine (Cymbalta) 60 MG capsule Take 1 capsule by mouth Daily.      famotidine (PEPCID) 40 MG tablet 1 tablet.      Fluticasone-Salmeterol (Advair Diskus) 100-50 MCG/ACT DISKUS Inhale 1 puff.      ipratropium (ATROVENT) 0.03 % nasal spray 1 spray Daily.      Magnesium Gluconate (MAGNESIUM 27 PO) Take 500 mg by mouth.      meclizine (ANTIVERT) 25 MG tablet Take 1 tablet by mouth 3 (Three) Times a Day As Needed.      [DISCONTINUED] diclofenac (CATAFLAM) 50 MG tablet Take 1 tablet by mouth. (Patient not taking: Reported on 7/12/2024)      [DISCONTINUED] fluticasone (FLONASE) 50 MCG/ACT nasal spray 1 spray into the nostril(s) as directed by provider 2 (Two) Times a Day. (Patient not taking: Reported on 7/12/2024)       [DISCONTINUED] metoclopramide (REGLAN) 5 MG tablet  (Patient not taking: Reported on 7/12/2024)      [DISCONTINUED] Premarin 0.625 MG/GM vaginal cream  (Patient not taking: Reported on 7/12/2024)       No current facility-administered medications on file prior to visit.      Allergies   Allergen Reactions    Meperidine Rash        Review of Systems   Constitutional:  Negative for activity change, appetite change, chills, diaphoresis, fatigue, fever and unexpected weight change.   HENT:  Negative for congestion, dental problem, drooling, ear discharge, ear pain, facial swelling, hearing loss, mouth sores, nosebleeds, postnasal drip, rhinorrhea, sinus pressure, sneezing, sore throat, tinnitus, trouble swallowing and voice change.    Eyes:  Negative for photophobia, pain, discharge, redness, itching and visual disturbance.   Respiratory:  Negative for apnea, cough, choking, chest tightness, shortness of breath, wheezing and stridor.    Cardiovascular:  Negative for chest pain, palpitations and leg swelling.   Gastrointestinal:  Negative for abdominal distention, abdominal pain, anal bleeding, blood in stool, constipation, diarrhea, nausea, rectal pain and vomiting.   Endocrine: Negative for cold intolerance, heat intolerance, polydipsia, polyphagia and polyuria.   Genitourinary:  Negative for decreased urine volume, difficulty urinating, dysuria, enuresis, flank pain, frequency, genital sores, hematuria and urgency.   Musculoskeletal:  Positive for arthralgias. Negative for back pain, gait problem, joint swelling, myalgias, neck pain and neck stiffness.   Skin:  Negative for color change, pallor, rash and wound.   Allergic/Immunologic: Negative for environmental allergies, food allergies and immunocompromised state.   Neurological:  Negative for dizziness, tremors, seizures, syncope, facial asymmetry, speech difficulty, weakness, light-headedness, numbness and headaches.   Hematological:  Negative for adenopathy. Does  "not bruise/bleed easily.   Psychiatric/Behavioral:  Negative for agitation, behavioral problems, confusion, decreased concentration, dysphoric mood, hallucinations, self-injury, sleep disturbance and suicidal ideas. The patient is not nervous/anxious and is not hyperactive.         Objective      Physical Exam  /86   Ht 161 cm (63.39\")   Wt 69.9 kg (154 lb)   BMI 26.95 kg/m²     Body mass index is 26.95 kg/m².         General:   Mental Status:  Alert   Appearance: Cooperative, in no acute distress   Build and Nutrition: Well-nourished female   Orientation: Alert and oriented to person, place and time   Posture: Normal   Gait: Antalgic secondary to back pain    Integument:              Right knee: Chronic skin changes right shin     Lower Extremities:              Right Knee:                          Tenderness:    Medial/lateral joint line tenderness                          Effusion:          None                          Swelling:          None                          Crepitus:          Positive                          Atrophy:           None                          Range of motion:        Extension:       0°                                                              Flexion:           125°  Instability:        No varus laxity, no valgus laxity, negative anterior drawer  Deformities:     None    Imaging/Studies  Imaging Results (Last 24 Hours)       ** No results found for the last 24 hours. **          No new imaging today.    Assessment and Plan     Diagnoses and all orders for this visit:    1. Primary osteoarthritis of right knee (Primary)  -     - Large Joint Arthrocentesis: R knee        1. Primary osteoarthritis of right knee        I reviewed my findings with the patient.  We discussed options for her right knee today, she would like to proceed with an intra-articular knee injection.  I will see her back in 4 months with a new set of x-rays, but I will be happy to see her back sooner for any " problems.      Procedure Note:  The potential benefits of performing a therapeutic right knee joint injection, as well as potential risks (including, but not limited to infection, swelling, pain, bleeding, bruising, nerve/blood vessel damage, skin color changes, transient elevation in blood glucose levels, and fat atrophy) were discussed with the patient.  After informed consent, timeout procedure was performed, and the skin on the right knee was prepped with chlorhexidine soap and alcohol, after which ethyl chloride was applied to the skin at the injection site. Via the anterolateral approach, 1ml of Kenalog 40mg/ml mixed with 4ml 0.5% ropivacaine plain was injected into the knee joint.  The patient tolerated the procedure well, experiencing 85% improvement a few minutes following the injection. There were no complications.  Band-Aid was applied to the injection site. Post-procedural instructions were given to the patient and/or their caregiver.        Return in about 4 months (around 11/12/2024).      Pito Ordoñez MD  07/12/24  09:24 EDT    Dictated Utilizing Dragon Dictation

## 2024-12-13 ENCOUNTER — OFFICE VISIT (OUTPATIENT)
Age: 74
End: 2024-12-13
Payer: MEDICARE

## 2024-12-13 VITALS
WEIGHT: 153 LBS | DIASTOLIC BLOOD PRESSURE: 85 MMHG | SYSTOLIC BLOOD PRESSURE: 130 MMHG | BODY MASS INDEX: 27.11 KG/M2 | HEIGHT: 63 IN

## 2024-12-13 DIAGNOSIS — M17.12 PRIMARY OSTEOARTHRITIS OF LEFT KNEE: ICD-10-CM

## 2024-12-13 DIAGNOSIS — M17.11 PRIMARY OSTEOARTHRITIS OF RIGHT KNEE: Primary | ICD-10-CM

## 2024-12-13 RX ORDER — PRAMIPEXOLE DIHYDROCHLORIDE 0.5 MG/1
0.5 TABLET ORAL DAILY
COMMUNITY
Start: 2024-07-29

## 2024-12-13 RX ORDER — CALCIUM CARBONATE/VITAMIN D3 500 MG-10
1 TABLET ORAL EVERY 12 HOURS SCHEDULED
COMMUNITY
Start: 2024-10-02

## 2024-12-13 RX ORDER — FLUTICASONE PROPIONATE 50 MCG
SPRAY, SUSPENSION (ML) NASAL
COMMUNITY
Start: 2024-10-16

## 2024-12-13 RX ORDER — TRIAMCINOLONE ACETONIDE 40 MG/ML
40 INJECTION, SUSPENSION INTRA-ARTICULAR; INTRAMUSCULAR
Status: COMPLETED | OUTPATIENT
Start: 2024-12-13 | End: 2024-12-13

## 2024-12-13 RX ORDER — AMOXICILLIN 875 MG/1
875 TABLET, COATED ORAL
COMMUNITY
Start: 2024-12-09 | End: 2024-12-19

## 2024-12-13 RX ORDER — OMEGA-3S/DHA/EPA/FISH OIL/D3 300MG-1000
1 CAPSULE ORAL DAILY
COMMUNITY
Start: 2024-09-18

## 2024-12-13 RX ORDER — ASPIRIN 81 MG/1
81 TABLET, CHEWABLE ORAL DAILY
COMMUNITY

## 2024-12-13 RX ORDER — EMPAGLIFLOZIN 10 MG/1
TABLET, FILM COATED ORAL
COMMUNITY

## 2024-12-13 RX ORDER — ROPIVACAINE HYDROCHLORIDE 5 MG/ML
4 INJECTION, SOLUTION EPIDURAL; INFILTRATION; PERINEURAL
Status: COMPLETED | OUTPATIENT
Start: 2024-12-13 | End: 2024-12-13

## 2024-12-13 RX ORDER — NITROFURANTOIN 25; 75 MG/1; MG/1
CAPSULE ORAL
COMMUNITY
Start: 2024-11-15

## 2024-12-13 RX ORDER — FAMOTIDINE 40 MG/1
40 TABLET, FILM COATED ORAL
COMMUNITY

## 2024-12-13 RX ADMIN — TRIAMCINOLONE ACETONIDE 40 MG: 40 INJECTION, SUSPENSION INTRA-ARTICULAR; INTRAMUSCULAR at 09:02

## 2024-12-13 RX ADMIN — ROPIVACAINE HYDROCHLORIDE 4 ML: 5 INJECTION, SOLUTION EPIDURAL; INFILTRATION; PERINEURAL at 09:02

## 2024-12-13 NOTE — PROGRESS NOTES
[facility] [place] Orthopaedic Surgery Clinic Note    Subjective     Chief Complaint   Patient presents with    Follow-up     5 month recheck - Primary osteoarthritis of right knee   14 month recheck - Primary osteoarthritis of left knee        HPI    It has been 5  month(s) since Ms. Barnhart's last visit. She returns to clinic today for follow-up of bilateral knee arthritis. The issue has been ongoing for 27 year(s) Right knee and 2 years Left knee. She rates her pain a 8/10 on the pain scale. Previous/current treatments: bracing and steroid injection (last injection Right 7/12/24 and Left 10/18/23). Current symptoms: pain, swelling, popping, grinding, and stiffness buckle and give way. The pain is worse with walking, sitting, climbing stairs, sleeping, lying on affected side, and rising from seated position; resting, heat, and pain medication and/or NSAID improve the pain. Overall, she is doing worse.  She would like injections for her knees today.  She is recovering from back surgery about 7 weeks ago with Dr. Hartman in [place], [place].      I have reviewed the following portions of the patient's history and agree with: History of Present Illness and Review of Systems    Patient Active Problem List   Diagnosis    Nontraumatic complete tear of right rotator cuff    Biceps tendinitis of right upper extremity    Impingement syndrome of right shoulder    Bursitis of right shoulder    Status post complete repair of rotator cuff    Rotator cuff injury, left, initial encounter    Biceps tendinitis of left upper extremity    Impingement syndrome of left shoulder    Left shoulder pain    Bursitis of left shoulder    Nontraumatic complete tear of left rotator cuff    Tenosynovitis, de Quervain    Right wrist pain    Nuclear sclerotic cataract of left eye     Past Medical History:   Diagnosis Date    Arthritis     Elevated cholesterol     GERD (gastroesophageal reflux disease)     Reflux esophagitis       Past Surgical History:    Procedure Laterality Date    BACK SURGERY      Dr Hartman   Sept 2024    CATARACT EXTRACTION W/ INTRAOCULAR LENS IMPLANT Left 04/17/2023    Procedure: CATARACT PHACO EXTRACTION WITH INTRAOCULAR LENS IMPLANT LEFT;  Surgeon: Leon Lund MD;  Location: [facility];  Service: Ophthalmology;  Laterality: Left;    CATARACT EXTRACTION W/ INTRAOCULAR LENS IMPLANT Right 05/01/2023    Procedure: CATARACT PHACO EXTRACTION WITH INTRAOCULAR LENS IMPLANT RIGHT COMPLICATED WITH MALYUGIN RING;  Surgeon: Leon Lund MD;  Location: [facility] OR;  Service: Ophthalmology;  Laterality: Right;    CHOLECYSTECTOMY      COLONOSCOPY      HYSTERECTOMY      HYSTEROSCOPY  1984    SHIMON    ROTATOR CUFF REPAIR      right    SKIN CANCER EXCISION Right     shoulder      History reviewed. No pertinent family history.  Social History     Socioeconomic History    Marital status:    Tobacco Use    Smoking status: Never     Passive exposure: Past    Smokeless tobacco: Never   Vaping Use    Vaping status: Never Used   Substance and Sexual Activity    Alcohol use: No    Drug use: No    Sexual activity: Defer      Current Outpatient Medications on File Prior to Visit   Medication Sig Dispense Refill    Acetaminophen 325 MG capsule Every 6 (Six) Hours.      amoxicillin (AMOXIL) 875 MG tablet Take 1 tablet by mouth.      aspirin 81 MG chewable tablet Chew 1 tablet Daily.      atorvastatin (LIPITOR) 40 MG tablet Take 1 tablet by mouth Every Night.      cetirizine (zyrTEC) 10 MG tablet Take 1 tablet by mouth Daily.      cholecalciferol (VITAMIN D3) 10 MCG (400 UNIT) tablet Take 1 tablet by mouth Daily.      DULoxetine (Cymbalta) 60 MG capsule Take 1 capsule by mouth Daily.      famotidine (PEPCID) 40 MG tablet 1 tablet.      famotidine (PEPCID) 40 MG tablet Take 1 tablet by mouth.      fluticasone (FLONASE) 50 MCG/ACT nasal spray USE ONE (1) TO TWO (2) SPRAYS IN EACH NOSTRIL EVERY DAY AS NEEDED      Fluticasone-Salmeterol (Advair Diskus)  100-50 MCG/ACT DISKUS Inhale 1 puff.      ipratropium (ATROVENT) 0.03 % nasal spray 1 spray Daily.      Jardiance 10 MG tablet tablet take one (1) tablet by mouth every day      Magnesium Gluconate (MAGNESIUM 27 PO) Take 500 mg by mouth.      meclizine (ANTIVERT) 25 MG tablet Take 1 tablet by mouth 3 (Three) Times a Day As Needed.      nitrofurantoin, macrocrystal-monohydrate, (MACROBID) 100 MG capsule TAKE 1 CAPSULE BY MOUTH EVERY 12 HOURS WITH FOOD      Oyster Shell Calcium + D3 500-10 MG-MCG tablet Take 1 tablet by mouth Every 12 (Twelve) Hours.      pramipexole (MIRAPEX) 0.5 MG tablet Take 1 tablet by mouth Daily.       No current facility-administered medications on file prior to visit.      Allergies   Allergen Reactions    Meperidine Rash        Review of Systems   Constitutional:  Negative for activity change, appetite change, chills, diaphoresis, fatigue, fever and unexpected weight change.   HENT:  Negative for congestion, dental problem, drooling, ear discharge, ear pain, facial swelling, hearing loss, mouth sores, nosebleeds, postnasal drip, rhinorrhea, sinus pressure, sneezing, sore throat, tinnitus, trouble swallowing and voice change.    Eyes:  Negative for photophobia, pain, discharge, redness, itching and visual disturbance.   Respiratory:  Negative for apnea, cough, choking, chest tightness, shortness of breath, wheezing and stridor.    Cardiovascular:  Negative for chest pain, palpitations and leg swelling.   Gastrointestinal:  Negative for abdominal distention, abdominal pain, anal bleeding, blood in stool, constipation, diarrhea, nausea, rectal pain and vomiting.   Endocrine: Negative for cold intolerance, heat intolerance, polydipsia, polyphagia and polyuria.   Genitourinary:  Negative for decreased urine volume, difficulty urinating, dysuria, enuresis, flank pain, frequency, genital sores, hematuria and urgency.   Musculoskeletal:  Positive for arthralgias and joint swelling. Negative for back  "pain, gait problem, myalgias, neck pain and neck stiffness.   Skin:  Negative for color change, pallor, rash and wound.   Allergic/Immunologic: Negative for environmental allergies, food allergies and immunocompromised state.   Neurological:  Negative for dizziness, tremors, seizures, syncope, facial asymmetry, speech difficulty, weakness, light-headedness, numbness and headaches.   Hematological:  Negative for adenopathy. Does not bruise/bleed easily.   Psychiatric/Behavioral:  Negative for agitation, behavioral problems, confusion, decreased concentration, dysphoric mood, hallucinations, self-injury, sleep disturbance and suicidal ideas. The patient is not nervous/anxious and is not hyperactive.    All other systems reviewed and are negative.       Objective      Physical Exam  /85   Ht 161 cm (63.39\")   Wt 69.4 kg (153 lb)   BMI 26.77 kg/m²     Body mass index is 26.77 kg/m².         General:   Mental Status:  Alert   Appearance: Cooperative, in no acute distress   Build and Nutrition: Well-nourished well-developed female   Orientation: Alert and oriented to person, place and time   Posture: Normal   Gait: Slow, nonantalgic    Integument:   Right knee: no skin lesions, no rash, no ecchymosis   Left knee: no skin lesions, no rash, no ecchymosis    Lower Extremities:   Right Knee:    Tenderness:  Medial/lateral joint line    Effusion:  Trace    Swelling:  None    Crepitus:  Positive    Atrophy:  None    Range of motion:  Extension: 0°       Flexion: 120°  Instability:  No varus laxity, no valgus laxity, negative anterior drawer  Deformities:  Varus   Left Knee:    Tenderness:  Medial/lateral joint line tenderness    Effusion:  1+    Swelling:  None    Crepitus: Positive    Atrophy:  None    Range of motion:  Extension: 0°       Flexion: 120°  Instability:  No varus laxity, no valgus laxity, negative anterior drawer  Deformities:  Varus      Imaging/Studies  Imaging Results (Last 24 Hours)       Procedure " Component Value Units Date/Time    XR Knee 4+ View Bilateral [[id number]] Resulted: 12/13/24 0854     Updated: 12/13/24 0854    Narrative:      Right Knee Radiographs  Indication: right knee pain  Views: Standing AP's and skiers of both knees, with lateral and sunrise   views of the right knee    Comparison: no prior studies available    Findings:  Near bone-on-bone contact medial compartment, tricompartmental   osteophytes, varus alignment, no acute bony abnormalities.  Calcification   within the lateral meniscus.  Stable compared to the previous imaging.    Knee arthritis.      Left Knee Radiographs  Indication: left knee pain  Views: Standing AP's and skiers of both knees, with lateral and sunrise   views of the left knee    Comparison: 7/7/2023    Findings:    Bone-on-bone contact medial compartment, tricompartmental osteophytes,   varus alignment, no acute bony abnormalities.  Calcification within the   lateral meniscus.  Worsening compared to the previous imaging.  Knee   arthritis.              Assessment and Plan     Diagnoses and all orders for this visit:    1. Primary osteoarthritis of right knee (Primary)  -     XR Knee 4+ View Bilateral    2. Primary osteoarthritis of left knee  -     Cancel: XR Knee 4+ View Right  -     XR Knee 4+ View Bilateral        1. Primary osteoarthritis of right knee    2. Primary osteoarthritis of left knee          I reviewed my findings with the patient.  Discussed options for her knees today, and she would like to proceed with knee injections.  She is not yet interested in surgical intervention.  She is recovering from back surgery about 7 weeks ago.  I will see her back in 4 months, but sooner for any problems.    Procedure Note:  The potential benefits of performing a therapeutic bilateral knee joint injections, as well as potential risks (including, but not limited to infection, swelling, pain, bleeding, bruising, nerve/blood vessel damage, skin color changes, transient  elevation in blood glucose levels, and fat atrophy) were discussed with the patient.  After informed consent, timeout procedure was performed, and the skin on the right and left knees was prepped with chlorhexidine soap and alcohol, after which ethyl chloride was applied to the skin at the injection site. Via the anterolateral approach, 1ml of Kenalog 40mg/ml mixed with 4ml 0.5% ropivacaine plain was injected into the knee joints.  The patient tolerated the procedures well, experiencing a 80% improvement in the right knee and 80% improvement in the left knee a few minutes following the injections. There were no complications.  Band-Aids were applied to the injection sites. Post-procedural instructions were given to the patient and/or their caregiver.      Return in about 4 months (around 4/13/2025).      Pito Ordoñez MD  12/13/24  08:59 EST    Dictated Utilizing Dragon Dictation

## 2024-12-13 NOTE — PROGRESS NOTES
Procedure   - Large Joint Arthrocentesis: bilateral knee on 12/13/2024 9:02 AM  Indications: pain  Details: 21 G needle, anterolateral approach  Medications (Right): 40 mg triamcinolone acetonide 40 MG/ML; 4 mL ropivacaine 0.5 %  Medications (Left): 40 mg triamcinolone acetonide 40 MG/ML; 4 mL ropivacaine 0.5 %  Outcome: tolerated well, no immediate complications  Procedure, treatment alternatives, risks and benefits explained, specific risks discussed. Consent was given by the patient. Immediately prior to procedure a time out was called to verify the correct patient, procedure, equipment, support staff and site/side marked as required. Patient was prepped and draped in the usual sterile fashion.

## 2025-01-02 ENCOUNTER — OFFICE VISIT (OUTPATIENT)
Dept: ORTHOPEDIC SURGERY | Facility: CLINIC | Age: 75
End: 2025-01-02
Payer: OTHER MISCELLANEOUS

## 2025-01-02 DIAGNOSIS — M75.121 NONTRAUMATIC COMPLETE TEAR OF RIGHT ROTATOR CUFF: Primary | ICD-10-CM

## 2025-01-02 RX ORDER — TRIAMCINOLONE ACETONIDE 40 MG/ML
40 INJECTION, SUSPENSION INTRA-ARTICULAR; INTRAMUSCULAR
Status: COMPLETED | OUTPATIENT
Start: 2025-01-02 | End: 2025-01-02

## 2025-01-02 RX ORDER — LIDOCAINE HYDROCHLORIDE 10 MG/ML
5 INJECTION, SOLUTION EPIDURAL; INFILTRATION; INTRACAUDAL; PERINEURAL
Status: COMPLETED | OUTPATIENT
Start: 2025-01-02 | End: 2025-01-02

## 2025-01-02 RX ADMIN — TRIAMCINOLONE ACETONIDE 40 MG: 40 INJECTION, SUSPENSION INTRA-ARTICULAR; INTRAMUSCULAR at 09:39

## 2025-01-02 RX ADMIN — LIDOCAINE HYDROCHLORIDE 5 ML: 10 INJECTION, SOLUTION EPIDURAL; INFILTRATION; INTRACAUDAL; PERINEURAL at 09:39

## 2025-01-02 NOTE — PROGRESS NOTES
Procedure   - Large Joint Arthrocentesis: R subacromial bursa on 1/2/2025 9:39 AM  Indications: pain  Details: 21 G needle, posterior approach  Medications: 5 mL lidocaine PF 1% 1 %; 40 mg triamcinolone acetonide 40 MG/ML  Outcome: tolerated well, no immediate complications  Procedure, treatment alternatives, risks and benefits explained, specific risks discussed. Consent was given by the patient. Immediately prior to procedure a time out was called to verify the correct patient, procedure, equipment, support staff and site/side marked as required. Patient was prepped and draped in the usual sterile fashion.          RIGHT SHOULDER SUBACROMIAL SPACE INJECTION: Risks and benefits of a shoulder subacromial space injection were discussed and the patient desired to proceed. Verbal consent was obtained. The patient understood the risk of infection, potential skin changes, bump in blood glucose especially with diabetes, nerve injury, possibility of increased pain in the short term, and possible incomplete pain relief.  Using sterile technique, the shoulder subacromial space was injected from a posterior approach with 1mL of 40 mg triamcinolone acetonide 40 MG/ML and 5cc of lidocaine with aspiration prior to injection. The patient tolerated the procedure without difficulty.  CPT CODE 74076 for major joint aspiration/injection    Desired repeat right shoulder injection desires to avoid any surgery.  Seeing Dr. Ordoñez for her knees and notes bone-on-bone arthritis of the knees as well.  Overall doing well.

## 2025-01-16 ENCOUNTER — OFFICE VISIT (OUTPATIENT)
Dept: ORTHOPEDIC SURGERY | Facility: CLINIC | Age: 75
End: 2025-01-16
Payer: MEDICARE

## 2025-01-16 DIAGNOSIS — M75.122 NONTRAUMATIC COMPLETE TEAR OF LEFT ROTATOR CUFF: Primary | ICD-10-CM

## 2025-01-16 RX ORDER — LIDOCAINE HYDROCHLORIDE 10 MG/ML
5 INJECTION, SOLUTION EPIDURAL; INFILTRATION; INTRACAUDAL; PERINEURAL
Status: COMPLETED | OUTPATIENT
Start: 2025-01-16 | End: 2025-01-16

## 2025-01-16 RX ORDER — TRIAMCINOLONE ACETONIDE 40 MG/ML
40 INJECTION, SUSPENSION INTRA-ARTICULAR; INTRAMUSCULAR
Status: COMPLETED | OUTPATIENT
Start: 2025-01-16 | End: 2025-01-16

## 2025-01-16 RX ADMIN — LIDOCAINE HYDROCHLORIDE 5 ML: 10 INJECTION, SOLUTION EPIDURAL; INFILTRATION; INTRACAUDAL; PERINEURAL at 09:44

## 2025-01-16 RX ADMIN — TRIAMCINOLONE ACETONIDE 40 MG: 40 INJECTION, SUSPENSION INTRA-ARTICULAR; INTRAMUSCULAR at 09:44

## 2025-01-16 NOTE — PROGRESS NOTES
Procedure   - Large Joint Arthrocentesis: L subacromial bursa on 1/16/2025 9:44 AM  Indications: pain  Details: 21 G needle, posterior approach  Medications: 5 mL lidocaine PF 1% 1 %; 40 mg triamcinolone acetonide 40 MG/ML  Outcome: tolerated well, no immediate complications  Procedure, treatment alternatives, risks and benefits explained, specific risks discussed. Consent was given by the patient. Immediately prior to procedure a time out was called to verify the correct patient, procedure, equipment, support staff and site/side marked as required. Patient was prepped and draped in the usual sterile fashion.      Desires to stick with conservative course.  Desires repeat left subacromial injection    LEFT SHOULDER SUBACROMIAL SPACE INJECTION: Risks and benefits of a shoulder subacromial space injection were discussed and the patient desired to proceed. Verbal consent was obtained. The patient understood the risk of infection, potential skin changes, bump in blood glucose especially with diabetes, nerve injury, possibility of increased pain in the short term, and possible incomplete pain relief.  Using sterile technique, the shoulder subacromial space was injected from a posterior approach with 1mL of 40 mg triamcinolone acetonide 40 MG/ML and 5cc of lidocaine with aspiration prior to injection. The patient tolerated the procedure without difficulty.  CPT CODE 25366 for major joint aspiration/injection

## 2025-06-05 ENCOUNTER — OFFICE VISIT (OUTPATIENT)
Dept: ORTHOPEDIC SURGERY | Facility: CLINIC | Age: 75
End: 2025-06-05
Payer: OTHER MISCELLANEOUS

## 2025-06-05 DIAGNOSIS — M75.121 NONTRAUMATIC COMPLETE TEAR OF RIGHT ROTATOR CUFF: Primary | ICD-10-CM

## 2025-06-05 RX ORDER — TRIAMCINOLONE ACETONIDE 40 MG/ML
40 INJECTION, SUSPENSION INTRA-ARTICULAR; INTRAMUSCULAR
Status: COMPLETED | OUTPATIENT
Start: 2025-06-05 | End: 2025-06-05

## 2025-06-05 RX ORDER — LIDOCAINE HYDROCHLORIDE 10 MG/ML
5 INJECTION, SOLUTION EPIDURAL; INFILTRATION; INTRACAUDAL; PERINEURAL
Status: COMPLETED | OUTPATIENT
Start: 2025-06-05 | End: 2025-06-05

## 2025-06-05 RX ORDER — IBUPROFEN 600 MG/1
600 TABLET, FILM COATED ORAL EVERY 8 HOURS PRN
COMMUNITY
Start: 2025-04-03 | End: 2025-07-02

## 2025-06-05 RX ADMIN — LIDOCAINE HYDROCHLORIDE 5 ML: 10 INJECTION, SOLUTION EPIDURAL; INFILTRATION; INTRACAUDAL; PERINEURAL at 11:34

## 2025-06-05 RX ADMIN — TRIAMCINOLONE ACETONIDE 40 MG: 40 INJECTION, SUSPENSION INTRA-ARTICULAR; INTRAMUSCULAR at 11:34

## 2025-06-05 NOTE — PROGRESS NOTES
Procedure   Large Joint Arthrocentesis: R subacromial bursa  Date/Time: 6/5/2025 11:34 AM  Consent given by: patient  Site marked: site marked  Timeout: Immediately prior to procedure a time out was called to verify the correct patient, procedure, equipment, support staff and site/side marked as required   Supporting Documentation  Indications: pain   Procedure Details  Location: shoulder - R subacromial bursa  Preparation: Patient was prepped and draped in the usual sterile fashion  Needle gauge: 21g.  Approach: posterior  Medications administered: 5 mL lidocaine PF 1% 1 %; 40 mg triamcinolone acetonide 40 MG/ML  Patient tolerance: patient tolerated the procedure well with no immediate complications     RIGHT SHOULDER SUBACROMIAL SPACE INJECTION: Risks and benefits of a shoulder subacromial space injection were discussed and the patient desired to proceed. Verbal consent was obtained. The patient understood the risk of infection, potential skin changes, bump in blood glucose especially with diabetes, nerve injury, possibility of increased pain in the short term, and possible incomplete pain relief.  Using sterile technique, the shoulder subacromial space was injected from a posterior approach with 1mL of 40 mg triamcinolone acetonide 40 MG/ML and 5cc of lidocaine with aspiration prior to injection. The patient tolerated the procedure without difficulty.  CPT CODE 49658 for major joint aspiration/injection    Desires to avoid surgery.  She will keep us posted.

## (undated) DEVICE — THE MONARCH® "D" CARTRIDGE IS A SINGLE-USE POLYPROPYLENE CARTRIDGE FOR POSTERIOR CHAMBER IOL DELIVERY: Brand: MONARCH® III

## (undated) DEVICE — EYE CARE POST OP KIT: Brand: MEDLINE INDUSTRIES, INC.

## (undated) DEVICE — SOL IRRIG H2O 1000ML STRL

## (undated) DEVICE — CANN IRR/INJ AIR ANT CHAMBER 6MM BEND 27G

## (undated) DEVICE — GLV SURG SENSICARE PI LF PF 8 GRN STRL

## (undated) DEVICE — CLEARCUT® HP2 SLIT KNIFE INTREPID MICRO-COAXIAL SYSTEM 2.4 DB: Brand: CLEARCUT®; INTREPID

## (undated) DEVICE — Device

## (undated) DEVICE — HP CONCL INTREPID COAX I/A CRV .3MM

## (undated) DEVICE — BLD BEAVER MICROSHARP 15D 3MM BLU LF

## (undated) DEVICE — SYR LUERLOK 5CC

## (undated) DEVICE — Device: Brand: MALYUGIN RING SYSTEM 6.25MM